# Patient Record
Sex: FEMALE | Race: WHITE | Employment: FULL TIME | URBAN - METROPOLITAN AREA
[De-identification: names, ages, dates, MRNs, and addresses within clinical notes are randomized per-mention and may not be internally consistent; named-entity substitution may affect disease eponyms.]

---

## 2017-01-30 ENCOUNTER — OFFICE VISIT (OUTPATIENT)
Dept: INTERNAL MEDICINE CLINIC | Age: 52
End: 2017-01-30

## 2017-01-30 VITALS
RESPIRATION RATE: 14 BRPM | HEART RATE: 72 BPM | BODY MASS INDEX: 26.33 KG/M2 | SYSTOLIC BLOOD PRESSURE: 110 MMHG | DIASTOLIC BLOOD PRESSURE: 80 MMHG | WEIGHT: 177.8 LBS | HEIGHT: 69 IN

## 2017-01-30 DIAGNOSIS — R00.2 PALPITATIONS: ICD-10-CM

## 2017-01-30 DIAGNOSIS — Z12.11 SCREEN FOR COLON CANCER: ICD-10-CM

## 2017-01-30 DIAGNOSIS — I10 ESSENTIAL HYPERTENSION: Primary | ICD-10-CM

## 2017-01-30 DIAGNOSIS — F41.1 ANXIETY, GENERALIZED: ICD-10-CM

## 2017-01-30 DIAGNOSIS — R06.83 SNORING: ICD-10-CM

## 2017-01-30 DIAGNOSIS — R92.8 ABNORMAL MAMMOGRAM OF RIGHT BREAST: ICD-10-CM

## 2017-01-30 LAB
ANION GAP SERPL CALCULATED.3IONS-SCNC: 16 MMOL/L (ref 3–16)
BASOPHILS ABSOLUTE: 0 K/UL (ref 0–0.2)
BASOPHILS RELATIVE PERCENT: 0.8 %
BUN BLDV-MCNC: 17 MG/DL (ref 7–20)
CALCIUM SERPL-MCNC: 9 MG/DL (ref 8.3–10.6)
CHLORIDE BLD-SCNC: 101 MMOL/L (ref 99–110)
CO2: 23 MMOL/L (ref 21–32)
CREAT SERPL-MCNC: 0.8 MG/DL (ref 0.6–1.1)
EOSINOPHILS ABSOLUTE: 0.3 K/UL (ref 0–0.6)
EOSINOPHILS RELATIVE PERCENT: 5.7 %
GFR AFRICAN AMERICAN: >60
GFR NON-AFRICAN AMERICAN: >60
GLUCOSE BLD-MCNC: 84 MG/DL (ref 70–99)
HCT VFR BLD CALC: 46.2 % (ref 36–48)
HEMOGLOBIN: 15.2 G/DL (ref 12–16)
LYMPHOCYTES ABSOLUTE: 1.5 K/UL (ref 1–5.1)
LYMPHOCYTES RELATIVE PERCENT: 30.3 %
MCH RBC QN AUTO: 30.1 PG (ref 26–34)
MCHC RBC AUTO-ENTMCNC: 32.9 G/DL (ref 31–36)
MCV RBC AUTO: 91.7 FL (ref 80–100)
MONOCYTES ABSOLUTE: 0.5 K/UL (ref 0–1.3)
MONOCYTES RELATIVE PERCENT: 9.6 %
NEUTROPHILS ABSOLUTE: 2.7 K/UL (ref 1.7–7.7)
NEUTROPHILS RELATIVE PERCENT: 53.6 %
PDW BLD-RTO: 13.5 % (ref 12.4–15.4)
PLATELET # BLD: 224 K/UL (ref 135–450)
PMV BLD AUTO: 8.4 FL (ref 5–10.5)
POTASSIUM SERPL-SCNC: 4.2 MMOL/L (ref 3.5–5.1)
RBC # BLD: 5.04 M/UL (ref 4–5.2)
SODIUM BLD-SCNC: 140 MMOL/L (ref 136–145)
TSH SERPL DL<=0.05 MIU/L-ACNC: 1.58 UIU/ML (ref 0.27–4.2)
WBC # BLD: 4.9 K/UL (ref 4–11)

## 2017-01-30 PROCEDURE — 93000 ELECTROCARDIOGRAM COMPLETE: CPT | Performed by: INTERNAL MEDICINE

## 2017-01-30 PROCEDURE — 90686 IIV4 VACC NO PRSV 0.5 ML IM: CPT | Performed by: INTERNAL MEDICINE

## 2017-01-30 PROCEDURE — 90471 IMMUNIZATION ADMIN: CPT | Performed by: INTERNAL MEDICINE

## 2017-01-30 PROCEDURE — 99214 OFFICE O/P EST MOD 30 MIN: CPT | Performed by: INTERNAL MEDICINE

## 2017-01-30 RX ORDER — OLMESARTAN MEDOXOMIL 20 MG/1
TABLET ORAL
Qty: 90 TABLET | Refills: 1 | Status: SHIPPED | OUTPATIENT
Start: 2017-01-30 | End: 2017-01-30 | Stop reason: SDUPTHER

## 2017-01-30 RX ORDER — VENLAFAXINE HYDROCHLORIDE 37.5 MG/1
CAPSULE, EXTENDED RELEASE ORAL
Qty: 90 CAPSULE | Refills: 1 | Status: SHIPPED | OUTPATIENT
Start: 2017-01-30 | End: 2017-01-30 | Stop reason: SDUPTHER

## 2017-01-30 RX ORDER — VENLAFAXINE HYDROCHLORIDE 37.5 MG/1
CAPSULE, EXTENDED RELEASE ORAL
Qty: 90 CAPSULE | Refills: 1 | Status: SHIPPED | OUTPATIENT
Start: 2017-01-30 | End: 2017-10-06 | Stop reason: SDUPTHER

## 2017-01-30 RX ORDER — OLMESARTAN MEDOXOMIL 20 MG/1
TABLET ORAL
Qty: 90 TABLET | Refills: 1 | Status: SHIPPED | OUTPATIENT
Start: 2017-01-30 | End: 2017-02-03 | Stop reason: SDUPTHER

## 2017-01-30 ASSESSMENT — PATIENT HEALTH QUESTIONNAIRE - PHQ9
SUM OF ALL RESPONSES TO PHQ QUESTIONS 1-9: 0
SUM OF ALL RESPONSES TO PHQ9 QUESTIONS 1 & 2: 0
1. LITTLE INTEREST OR PLEASURE IN DOING THINGS: 0
2. FEELING DOWN, DEPRESSED OR HOPELESS: 0

## 2017-02-03 DIAGNOSIS — I10 ESSENTIAL HYPERTENSION: ICD-10-CM

## 2017-02-03 RX ORDER — OLMESARTAN MEDOXOMIL 20 MG/1
TABLET, FILM COATED ORAL
Qty: 90 TABLET | Refills: 1 | Status: SHIPPED | OUTPATIENT
Start: 2017-02-03 | End: 2017-03-14 | Stop reason: SDUPTHER

## 2017-03-14 ENCOUNTER — TELEPHONE (OUTPATIENT)
Dept: INTERNAL MEDICINE CLINIC | Age: 52
End: 2017-03-14

## 2017-03-14 DIAGNOSIS — I10 ESSENTIAL HYPERTENSION: ICD-10-CM

## 2017-03-14 RX ORDER — OLMESARTAN MEDOXOMIL 20 MG/1
20 TABLET ORAL DAILY
Qty: 30 TABLET | Refills: 0 | Status: SHIPPED | OUTPATIENT
Start: 2017-03-14 | End: 2019-06-04

## 2017-03-14 RX ORDER — OLMESARTAN MEDOXOMIL 20 MG/1
TABLET ORAL
Qty: 90 TABLET | Refills: 1 | Status: SHIPPED | OUTPATIENT
Start: 2017-03-14 | End: 2017-08-10 | Stop reason: SDUPTHER

## 2017-06-22 ENCOUNTER — OFFICE VISIT (OUTPATIENT)
Dept: INTERNAL MEDICINE CLINIC | Age: 52
End: 2017-06-22

## 2017-06-22 VITALS
HEART RATE: 73 BPM | DIASTOLIC BLOOD PRESSURE: 86 MMHG | TEMPERATURE: 98.9 F | SYSTOLIC BLOOD PRESSURE: 130 MMHG | RESPIRATION RATE: 14 BRPM

## 2017-06-22 DIAGNOSIS — L29.0 ANAL ITCHING: Primary | ICD-10-CM

## 2017-06-22 DIAGNOSIS — L30.9 DERMATITIS: ICD-10-CM

## 2017-06-22 PROCEDURE — 99213 OFFICE O/P EST LOW 20 MIN: CPT | Performed by: INTERNAL MEDICINE

## 2017-06-29 ENCOUNTER — TELEPHONE (OUTPATIENT)
Dept: PULMONOLOGY | Age: 52
End: 2017-06-29

## 2017-08-10 DIAGNOSIS — I10 ESSENTIAL HYPERTENSION: ICD-10-CM

## 2017-08-11 RX ORDER — OLMESARTAN MEDOXOMIL 20 MG/1
TABLET ORAL
Qty: 90 TABLET | Refills: 0 | Status: SHIPPED | OUTPATIENT
Start: 2017-08-11 | End: 2017-10-16 | Stop reason: SDUPTHER

## 2017-10-16 ENCOUNTER — OFFICE VISIT (OUTPATIENT)
Dept: INTERNAL MEDICINE CLINIC | Age: 52
End: 2017-10-16

## 2017-10-16 VITALS
BODY MASS INDEX: 27.74 KG/M2 | DIASTOLIC BLOOD PRESSURE: 78 MMHG | WEIGHT: 183 LBS | HEIGHT: 68 IN | OXYGEN SATURATION: 97 % | SYSTOLIC BLOOD PRESSURE: 134 MMHG | HEART RATE: 68 BPM

## 2017-10-16 DIAGNOSIS — Z23 INFLUENZA VACCINE NEEDED: ICD-10-CM

## 2017-10-16 DIAGNOSIS — R92.8 ABNORMAL MAMMOGRAM OF RIGHT BREAST: ICD-10-CM

## 2017-10-16 DIAGNOSIS — I10 ESSENTIAL HYPERTENSION: ICD-10-CM

## 2017-10-16 DIAGNOSIS — Z13.6 SCREENING FOR CARDIOVASCULAR CONDITION: ICD-10-CM

## 2017-10-16 DIAGNOSIS — F41.1 ANXIETY, GENERALIZED: ICD-10-CM

## 2017-10-16 DIAGNOSIS — Z00.00 ANNUAL PHYSICAL EXAM: Primary | ICD-10-CM

## 2017-10-16 PROCEDURE — 90471 IMMUNIZATION ADMIN: CPT | Performed by: INTERNAL MEDICINE

## 2017-10-16 PROCEDURE — 99213 OFFICE O/P EST LOW 20 MIN: CPT | Performed by: INTERNAL MEDICINE

## 2017-10-16 PROCEDURE — 90686 IIV4 VACC NO PRSV 0.5 ML IM: CPT | Performed by: INTERNAL MEDICINE

## 2017-10-16 PROCEDURE — 99396 PREV VISIT EST AGE 40-64: CPT | Performed by: INTERNAL MEDICINE

## 2017-10-16 RX ORDER — OLMESARTAN MEDOXOMIL 20 MG/1
TABLET ORAL
Qty: 90 TABLET | Refills: 1 | Status: SHIPPED | OUTPATIENT
Start: 2017-10-16 | End: 2018-06-19 | Stop reason: SDUPTHER

## 2017-10-16 NOTE — PROGRESS NOTES
Vaccine Information Sheet, \"Influenza - Inactivated\"  given to Juli Walls, or parent/legal guardian of  Juli Walls and verbalized understanding. Patient responses:    Have you ever had a reaction to a flu vaccine? No  Are you able to eat eggs without adverse effects? Yes  Do you have any current illness? No  Have you ever had Guillian [place] Syndrome? No    Flu vaccine given per order. Please see immunization tab.

## 2017-10-16 NOTE — PATIENT INSTRUCTIONS
problems early. · Cholesterol. Your doctor will tell you how often to have this done based on your age, family history, or other things that can increase your risk for heart attack and stroke. · Blood pressure. Have your blood pressure checked during a routine doctor visit. Your doctor will tell you how often to check your blood pressure based on your age, your blood pressure results, and other factors. · Mammogram. Ask your doctor how often you should have a mammogram, which is an X-ray of your breasts. A mammogram can spot breast cancer before it can be felt and when it is easiest to treat. · Pap test and pelvic exam. Ask your doctor how often you should have a Pap test. You may not need to have a Pap test as often as you used to. · Vision. Have your eyes checked every year or two or as often as your doctor suggests. Some experts recommend that you have yearly exams for glaucoma and other age-related eye problems starting at age 48. · Hearing. Tell your doctor if you notice any change in your hearing. You can have tests to find out how well you hear. · Diabetes. Ask your doctor whether you should have tests for diabetes. · Colon cancer. You should begin tests for colon cancer at age 48. You may have one of several tests. Your doctor will tell you how often to have tests based on your age and risk. Risks include whether you already had a precancerous polyp removed from your colon or whether your parents, sisters and brothers, or children have had colon cancer. · Thyroid disease. Talk to your doctor about whether to have your thyroid checked as part of a regular physical exam. Women have an increased chance of a thyroid problem. · Osteoporosis. You should begin tests for bone density at age 72. If you are younger than 72, ask your doctor whether you have factors that may increase your risk for this disease. You may want to have this test before age 72. · Heart attack and stroke risk.  At least every 4 to 6 years, you should have your risk for heart attack and stroke assessed. Your doctor uses factors such as your age, blood pressure, cholesterol, and whether you smoke or have diabetes to show what your risk for a heart attack or stroke is over the next 10 years. When should you call for help? Watch closely for changes in your health, and be sure to contact your doctor if you have any problems or symptoms that concern you. Where can you learn more? Go to [email or web address]://M[employer] Foodspediliaeb.[email or web address] org and sign in to your [employer] account. Enter M507 in the Searcheeze box to learn more about \"Well Visit, Women 50 to 72: Care Instructions. \"     If you do not have an account, please click on the \"Sign Up Now\" link. Current as of: July 19, 2016  Content Version: 11.3  © 4109-9279 [employer]. Care instructions adapted under license by [facility] [place] ([facility]). If you have questions about a medical condition or this instruction, always ask your healthcare professional. [place] [address] any warranty or liability for your use of this information.

## 2017-10-16 NOTE — ASSESSMENT & PLAN NOTE
Annual PE/Wellness exam  Discussed age appropriate preventive care including healthy diet, daily exercise, immunizations and age & gender guided screening tests.   Flu shot today  Will schedule colonoscopy  Screening mammo ( has implants) and fu US of R breast  Pap with gyne  Lipids- will return for fasting labs

## 2017-10-16 NOTE — PROGRESS NOTES
History and Physical      Juli Walls  YOB: 1965    Date of Service:  10/16/2017    Chief Complaint:   Juli Walls is a 47 y.o. female who presents for complete physical examination. HPI:   Physical and for follow up of htn, anxiety  Kids fine. Doing well. Had breast augmentation surgery in December. She was aware that a follow up [address] was due on her R breast in March ( 6 months post aspiration of breast cyst), but did not go. Hypertension:  Home blood pressure monitoring: Yes - nl. She is adherent to a low sodium diet. Patient denies chest pain, shortness of breath, headache, lightheadedness, blurred vision, peripheral edema, palpitations, dry cough and fatigue. Antihypertensive medication side effects: no medication side effects noted. Use of agents associated with hypertension: none. Sodium (mmol/L)   Date Value   01/30/2017 140    BUN (mg/dL)   Date Value   01/30/2017 17    Glucose (mg/dL)   Date Value   01/30/2017 84      Potassium (mmol/L)   Date Value   01/30/2017 4.2    CREATININE (mg/dL)   Date Value   01/30/2017 0.8         Mood Disorder:  Patient presents for follow-up of anxiety disorder. Current complaints include: none. She denies any other symptoms. Symptoms/signs of ulisses: none. External stressors: nothing new. Current treatment includes: Effexor- 37.5. Medication side effects: none.         Wt Readings from Last 3 Encounters:   10/16/17 183 lb (83 kg)   01/30/17 177 lb 12.8 oz (80.6 kg)   07/25/16 170 lb (77.1 kg)     BP Readings from Last 3 Encounters:   10/16/17 134/78   06/22/17 130/86   01/30/17 110/80       Patient Active Problem List   Diagnosis    Essential hypertension    Bell's palsy    Acne    Anxiety, generalized    Plantar fasciitis of right foot    Abnormal mammogram of right breast    Palpitations    Snoring    Anal itching    Annual physical exam       Preventive Care:  Health Maintenance   Topic Date Due    HIV screen  08/21/1981    Colon cancer screen colonoscopy  08/21/2016    Cervical cancer screen  11/24/2017    Breast cancer screen  08/30/2018    Lipid screen  07/25/2021    DTaP/Tdap/Td vaccine (2 - Td) 02/24/2022    Flu vaccine  Completed      Hx abnormal PAP: no  Sexual activity: single partner, contraception - IUD Dr Paige Saleh, gyne   Self-breast exams: yes  Previous DEXA scan: no  Last eye exam: 8/17, normal  Exercise: aerobics 3 time(s) per week  Seatbelt use: 100  Lipid panel:   Lab Results   Component Value Date    CHOL 212 (H) 07/25/2016    TRIG 89 07/25/2016    HDL 52 07/25/2016    LDLCALC 142 (H) 07/25/2016        Living will:  yes,   copy requested    Immunization History   Administered Date(s) Administered    Influenza Virus Vaccine 11/24/2014, 01/07/2016    Influenza, [place], 3 yrs and older, IM, Preservative Free 01/30/2017, 10/16/2017    Td 01/01/1996    Tdap (Boostrix, Adacel) 02/24/2012       No Known Allergies  Outpatient Prescriptions Marked as Taking for the 10/16/17 encounter (Office Visit) with Ting Tamayo MD   Medication Sig Dispense Refill    venlafaxine (EFFEXOR XR) 37.5 MG extended release capsule TAKE 1 CAPSULE BY MOUTH  DAILY 90 capsule 0    olmesartan (BENICAR) 20 MG tablet Take 1 tablet by mouth  daily 90 tablet 0    hydrocortisone 2.5 % ointment Apply topically 2 times daily For 2 weeks 30 g 0    miconazole (MICOTIN) 2 % cream Apply topically 2 times daily. 30 g 0    tretinoin (RETIN-A) 0.1 % cream Apply topically nightly.  60 g 1       Past Medical History:   Diagnosis Date    Anxiety     Bell's palsy     Blepharitis     Cough due to ACE inhibitor 11/7/2012    Hypertension      Past Surgical History:   Procedure Laterality Date    BREAST ENHANCEMENT SURGERY  11/30/2016    LIPOSUCTION  11/10/2016     Family History   Problem Relation Age of Onset    Hypertension Mother     Thyroid Disease Mother     Hypertension Sister     Thyroid Disease Sister      hashimotos    Other Sister      depression    Other Sister     Cancer Neg Hx     Asthma Neg Hx     Heart Failure Neg Hx     Rashes/Skin Problems Neg Hx     Seizures Neg Hx     Stroke Neg Hx      Social History     Social History    Marital status:      Spouse name: N/A    Number of children: 3    Years of education: BA     Occupational History    approval coordinator      [employer]          Social History Main Topics    Smoking status: Never Smoker    Smokeless tobacco: Never Used    Alcohol use 0.0 oz/week      Comment: occasionally . 1 drink/ day    Drug use: No    Sexual activity: Yes     Partners: Male     Birth control/ protection: IUD      Comment: Mirena     Other Topics Concern    Not on file     Social History Narrative    Lives in house with partner and 3 children ages 24, 23,13    Son sr at [employer]    Daughter at [employer]:  A comprehensive review of systems was negative except for what was noted in the HPI. Physical Exam:   Vitals:    10/16/17 1451 10/16/17 1516   BP: (!) 147/100 134/78   Pulse: 68    TempSrc: Oral    SpO2: 97%    Weight: 183 lb (83 kg)    Height: 5' 7.92\" (1.725 m)      Body mass index is 27.89 kg/m². Constitutional: She is oriented to person, place, and time. She appears well-developed and well-nourished. No distress. HEENT:   Head: Normocephalic and atraumatic. Right Ear: Tympanic membrane, external ear and ear canal normal.   Left Ear: Tympanic membrane, external ear and ear canal normal.   Nose: Nose normal.   Mouth/Throat: Oropharynx is clear and moist, and mucous membranes are normal.  There is no cervical adenopathy. Eyes: Conjunctivae and extraocular motions are normal. Pupils are equal, round, and reactive to light. Neck: Neck supple. No JVD present. Carotid bruit is not present. No mass and no thyromegaly present.    Cardiovascular: Normal rate, regular rhythm, normal heart

## 2017-10-20 DIAGNOSIS — Z13.6 SCREENING FOR CARDIOVASCULAR CONDITION: ICD-10-CM

## 2017-10-20 DIAGNOSIS — I10 ESSENTIAL HYPERTENSION: ICD-10-CM

## 2017-10-20 DIAGNOSIS — R73.01 ELEVATED FASTING GLUCOSE: Primary | ICD-10-CM

## 2017-10-20 LAB
A/G RATIO: 1.8 (ref 1.1–2.2)
ALBUMIN SERPL-MCNC: 4.4 G/DL (ref 3.4–5)
ALP BLD-CCNC: 57 U/L (ref 40–129)
ALT SERPL-CCNC: 40 U/L (ref 10–40)
ANION GAP SERPL CALCULATED.3IONS-SCNC: 15 MMOL/L (ref 3–16)
AST SERPL-CCNC: 20 U/L (ref 15–37)
BASOPHILS ABSOLUTE: 0 K/UL (ref 0–0.2)
BASOPHILS RELATIVE PERCENT: 0.5 %
BILIRUB SERPL-MCNC: 0.7 MG/DL (ref 0–1)
BUN BLDV-MCNC: 12 MG/DL (ref 7–20)
CALCIUM SERPL-MCNC: 9.3 MG/DL (ref 8.3–10.6)
CHLORIDE BLD-SCNC: 101 MMOL/L (ref 99–110)
CHOLESTEROL, TOTAL: 199 MG/DL (ref 0–199)
CO2: 23 MMOL/L (ref 21–32)
CREAT SERPL-MCNC: 0.8 MG/DL (ref 0.6–1.1)
EOSINOPHILS ABSOLUTE: 0.3 K/UL (ref 0–0.6)
EOSINOPHILS RELATIVE PERCENT: 5 %
GFR AFRICAN AMERICAN: >60
GFR NON-AFRICAN AMERICAN: >60
GLOBULIN: 2.4 G/DL
GLUCOSE BLD-MCNC: 101 MG/DL (ref 70–99)
HCT VFR BLD CALC: 45.2 % (ref 36–48)
HDLC SERPL-MCNC: 45 MG/DL (ref 40–60)
HEMOGLOBIN: 15.2 G/DL (ref 12–16)
LDL CHOLESTEROL CALCULATED: 135 MG/DL
LYMPHOCYTES ABSOLUTE: 1.6 K/UL (ref 1–5.1)
LYMPHOCYTES RELATIVE PERCENT: 29.8 %
MCH RBC QN AUTO: 31.1 PG (ref 26–34)
MCHC RBC AUTO-ENTMCNC: 33.6 G/DL (ref 31–36)
MCV RBC AUTO: 92.7 FL (ref 80–100)
MONOCYTES ABSOLUTE: 0.4 K/UL (ref 0–1.3)
MONOCYTES RELATIVE PERCENT: 7.6 %
NEUTROPHILS ABSOLUTE: 3.2 K/UL (ref 1.7–7.7)
NEUTROPHILS RELATIVE PERCENT: 57.1 %
PDW BLD-RTO: 13 % (ref 12.4–15.4)
PLATELET # BLD: 220 K/UL (ref 135–450)
PMV BLD AUTO: 8.9 FL (ref 5–10.5)
POTASSIUM SERPL-SCNC: 4.6 MMOL/L (ref 3.5–5.1)
RBC # BLD: 4.88 M/UL (ref 4–5.2)
SODIUM BLD-SCNC: 139 MMOL/L (ref 136–145)
TOTAL PROTEIN: 6.8 G/DL (ref 6.4–8.2)
TRIGL SERPL-MCNC: 94 MG/DL (ref 0–150)
TSH SERPL DL<=0.05 MIU/L-ACNC: 1.7 UIU/ML (ref 0.27–4.2)
VLDLC SERPL CALC-MCNC: 19 MG/DL
WBC # BLD: 5.5 K/UL (ref 4–11)

## 2017-10-24 DIAGNOSIS — R73.01 ELEVATED FASTING GLUCOSE: ICD-10-CM

## 2017-10-24 LAB
ESTIMATED AVERAGE GLUCOSE: 91.1 MG/DL
HBA1C MFR BLD: 4.8 %

## 2017-10-27 ENCOUNTER — TELEPHONE (OUTPATIENT)
Dept: INTERNAL MEDICINE CLINIC | Age: 52
End: 2017-10-27

## 2017-10-27 ENCOUNTER — HOSPITAL ENCOUNTER (OUTPATIENT)
Dept: MAMMOGRAPHY | Age: 52
Discharge: HOME | End: 2017-10-27
Admitting: INTERNAL MEDICINE

## 2017-10-27 DIAGNOSIS — Z12.31 VISIT FOR SCREENING MAMMOGRAM: ICD-10-CM

## 2017-10-27 DIAGNOSIS — R92.8 ABNORMAL MAMMOGRAM OF RIGHT BREAST: ICD-10-CM

## 2017-10-27 DIAGNOSIS — R92.8 ABNORMAL MAMMOGRAM OF RIGHT BREAST: Primary | ICD-10-CM

## 2017-10-27 DIAGNOSIS — R92.8 OTHER ABNORMAL AND INCONCLUSIVE FINDINGS ON DIAGNOSTIC IMAGING OF BREAST: ICD-10-CM

## 2017-12-28 DIAGNOSIS — F41.1 ANXIETY, GENERALIZED: ICD-10-CM

## 2017-12-29 RX ORDER — VENLAFAXINE HYDROCHLORIDE 37.5 MG/1
CAPSULE, EXTENDED RELEASE ORAL
Qty: 90 CAPSULE | Refills: 1 | Status: SHIPPED | OUTPATIENT
Start: 2017-12-29 | End: 2018-06-12 | Stop reason: SDUPTHER

## 2018-04-12 PROBLEM — Z00.00 ANNUAL PHYSICAL EXAM: Status: RESOLVED | Noted: 2017-10-16 | Resolved: 2018-04-12

## 2018-06-12 DIAGNOSIS — F41.1 ANXIETY, GENERALIZED: ICD-10-CM

## 2018-06-14 RX ORDER — VENLAFAXINE HYDROCHLORIDE 37.5 MG/1
CAPSULE, EXTENDED RELEASE ORAL
Qty: 90 CAPSULE | Refills: 0 | Status: SHIPPED | OUTPATIENT
Start: 2018-06-14 | End: 2018-06-19 | Stop reason: SDUPTHER

## 2018-06-19 ENCOUNTER — OFFICE VISIT (OUTPATIENT)
Dept: INTERNAL MEDICINE CLINIC | Age: 53
End: 2018-06-19

## 2018-06-19 VITALS
HEART RATE: 62 BPM | RESPIRATION RATE: 14 BRPM | WEIGHT: 183.2 LBS | BODY MASS INDEX: 27.92 KG/M2 | SYSTOLIC BLOOD PRESSURE: 118 MMHG | DIASTOLIC BLOOD PRESSURE: 78 MMHG

## 2018-06-19 DIAGNOSIS — Z23 NEED FOR PROPHYLACTIC VACCINATION AND INOCULATION AGAINST VARICELLA: ICD-10-CM

## 2018-06-19 DIAGNOSIS — I10 ESSENTIAL HYPERTENSION: ICD-10-CM

## 2018-06-19 DIAGNOSIS — M79.672 CHRONIC HEEL PAIN, LEFT: Primary | ICD-10-CM

## 2018-06-19 DIAGNOSIS — G89.29 CHRONIC HEEL PAIN, LEFT: Primary | ICD-10-CM

## 2018-06-19 DIAGNOSIS — F41.1 ANXIETY, GENERALIZED: ICD-10-CM

## 2018-06-19 DIAGNOSIS — Z12.11 COLON CANCER SCREENING: ICD-10-CM

## 2018-06-19 DIAGNOSIS — L70.9 ACNE, UNSPECIFIED ACNE TYPE: ICD-10-CM

## 2018-06-19 DIAGNOSIS — L70.9 ADULT ACNE: ICD-10-CM

## 2018-06-19 PROCEDURE — 99214 OFFICE O/P EST MOD 30 MIN: CPT | Performed by: INTERNAL MEDICINE

## 2018-06-19 RX ORDER — VENLAFAXINE HYDROCHLORIDE 37.5 MG/1
37.5 CAPSULE, EXTENDED RELEASE ORAL DAILY
Qty: 90 CAPSULE | Refills: 0 | Status: SHIPPED | OUTPATIENT
Start: 2018-06-19 | End: 2018-09-02 | Stop reason: SDUPTHER

## 2018-06-19 RX ORDER — OLMESARTAN MEDOXOMIL 20 MG/1
TABLET ORAL
Qty: 90 TABLET | Refills: 1 | Status: SHIPPED | OUTPATIENT
Start: 2018-06-19 | End: 2018-09-11 | Stop reason: SDUPTHER

## 2018-06-19 RX ORDER — TRETINOIN 1 MG/G
CREAM TOPICAL
Qty: 60 G | Refills: 1 | Status: SHIPPED | OUTPATIENT
Start: 2018-06-19 | End: 2019-12-05 | Stop reason: SDUPTHER

## 2018-06-19 ASSESSMENT — ENCOUNTER SYMPTOMS
WHEEZING: 0
COUGH: 0
COLOR CHANGE: 0
NAUSEA: 0
ABDOMINAL PAIN: 0
EYE PAIN: 0
BACK PAIN: 0
TROUBLE SWALLOWING: 0
SHORTNESS OF BREATH: 0
SINUS PRESSURE: 0
DIARRHEA: 0
VOMITING: 0
SINUS PAIN: 0
CONSTIPATION: 0
PHOTOPHOBIA: 0

## 2018-06-19 ASSESSMENT — PATIENT HEALTH QUESTIONNAIRE - PHQ9
SUM OF ALL RESPONSES TO PHQ QUESTIONS 1-9: 0
SUM OF ALL RESPONSES TO PHQ9 QUESTIONS 1 & 2: 0
2. FEELING DOWN, DEPRESSED OR HOPELESS: 0
1. LITTLE INTEREST OR PLEASURE IN DOING THINGS: 0

## 2018-07-18 ENCOUNTER — TELEPHONE (OUTPATIENT)
Dept: ORTHOPEDIC SURGERY | Age: 53
End: 2018-07-18

## 2018-09-02 DIAGNOSIS — F41.1 ANXIETY, GENERALIZED: ICD-10-CM

## 2018-09-04 RX ORDER — VENLAFAXINE HYDROCHLORIDE 37.5 MG/1
37.5 CAPSULE, EXTENDED RELEASE ORAL DAILY
Qty: 90 CAPSULE | Refills: 0 | Status: SHIPPED | OUTPATIENT
Start: 2018-09-04 | End: 2018-09-11 | Stop reason: SDUPTHER

## 2018-09-11 DIAGNOSIS — I10 ESSENTIAL HYPERTENSION: ICD-10-CM

## 2018-09-11 DIAGNOSIS — F41.1 ANXIETY, GENERALIZED: ICD-10-CM

## 2018-09-11 RX ORDER — OLMESARTAN MEDOXOMIL 20 MG/1
TABLET ORAL
Qty: 90 TABLET | Refills: 0 | Status: SHIPPED | OUTPATIENT
Start: 2018-09-11 | End: 2019-01-23 | Stop reason: SDUPTHER

## 2018-09-11 RX ORDER — VENLAFAXINE HYDROCHLORIDE 37.5 MG/1
37.5 CAPSULE, EXTENDED RELEASE ORAL DAILY
Qty: 90 CAPSULE | Refills: 0 | Status: SHIPPED | OUTPATIENT
Start: 2018-09-11 | End: 2018-12-14 | Stop reason: SDUPTHER

## 2018-12-14 ENCOUNTER — TELEPHONE (OUTPATIENT)
Dept: INTERNAL MEDICINE CLINIC | Age: 53
End: 2018-12-14

## 2018-12-14 DIAGNOSIS — F41.1 ANXIETY, GENERALIZED: ICD-10-CM

## 2018-12-14 RX ORDER — VENLAFAXINE HYDROCHLORIDE 37.5 MG/1
37.5 CAPSULE, EXTENDED RELEASE ORAL DAILY
Qty: 90 CAPSULE | Refills: 0 | Status: SHIPPED | OUTPATIENT
Start: 2018-12-14 | End: 2019-02-25 | Stop reason: SDUPTHER

## 2019-01-23 DIAGNOSIS — I10 ESSENTIAL HYPERTENSION: ICD-10-CM

## 2019-01-23 RX ORDER — OLMESARTAN MEDOXOMIL 20 MG/1
TABLET ORAL
Qty: 90 TABLET | Refills: 0 | Status: SHIPPED | OUTPATIENT
Start: 2019-01-23 | End: 2019-04-06 | Stop reason: SDUPTHER

## 2019-01-24 RX ORDER — OLMESARTAN MEDOXOMIL 20 MG/1
TABLET ORAL
Qty: 90 TABLET | OUTPATIENT
Start: 2019-01-24

## 2019-02-25 DIAGNOSIS — F41.1 ANXIETY, GENERALIZED: ICD-10-CM

## 2019-02-26 RX ORDER — VENLAFAXINE HYDROCHLORIDE 37.5 MG/1
37.5 CAPSULE, EXTENDED RELEASE ORAL DAILY
Qty: 90 CAPSULE | Refills: 0 | Status: SHIPPED | OUTPATIENT
Start: 2019-02-26 | End: 2019-05-08 | Stop reason: SDUPTHER

## 2019-04-06 DIAGNOSIS — I10 ESSENTIAL HYPERTENSION: ICD-10-CM

## 2019-04-08 RX ORDER — OLMESARTAN MEDOXOMIL 20 MG/1
TABLET ORAL
Qty: 90 TABLET | Refills: 0 | Status: SHIPPED | OUTPATIENT
Start: 2019-04-08 | End: 2019-06-04 | Stop reason: SDUPTHER

## 2019-04-15 ENCOUNTER — TELEPHONE (OUTPATIENT)
Dept: INTERNAL MEDICINE CLINIC | Age: 54
End: 2019-04-15

## 2019-05-08 DIAGNOSIS — F41.1 ANXIETY, GENERALIZED: ICD-10-CM

## 2019-05-09 RX ORDER — VENLAFAXINE HYDROCHLORIDE 37.5 MG/1
37.5 CAPSULE, EXTENDED RELEASE ORAL DAILY
Qty: 90 CAPSULE | Refills: 0 | Status: SHIPPED | OUTPATIENT
Start: 2019-05-09 | End: 2019-06-04 | Stop reason: SDUPTHER

## 2019-06-04 ENCOUNTER — OFFICE VISIT (OUTPATIENT)
Dept: INTERNAL MEDICINE CLINIC | Age: 54
End: 2019-06-04
Payer: COMMERCIAL

## 2019-06-04 VITALS
BODY MASS INDEX: 27.13 KG/M2 | HEIGHT: 68 IN | RESPIRATION RATE: 16 BRPM | DIASTOLIC BLOOD PRESSURE: 86 MMHG | WEIGHT: 179 LBS | OXYGEN SATURATION: 98 % | SYSTOLIC BLOOD PRESSURE: 134 MMHG | HEART RATE: 90 BPM

## 2019-06-04 DIAGNOSIS — F41.1 ANXIETY, GENERALIZED: ICD-10-CM

## 2019-06-04 DIAGNOSIS — I10 ESSENTIAL HYPERTENSION: ICD-10-CM

## 2019-06-04 DIAGNOSIS — Z12.11 COLON CANCER SCREENING: ICD-10-CM

## 2019-06-04 DIAGNOSIS — Z13.1 SCREENING FOR DIABETES MELLITUS (DM): ICD-10-CM

## 2019-06-04 DIAGNOSIS — Z12.39 BREAST CANCER SCREENING: ICD-10-CM

## 2019-06-04 DIAGNOSIS — Z00.00 ANNUAL PHYSICAL EXAM: Primary | ICD-10-CM

## 2019-06-04 PROCEDURE — 99396 PREV VISIT EST AGE 40-64: CPT | Performed by: INTERNAL MEDICINE

## 2019-06-04 RX ORDER — VENLAFAXINE HYDROCHLORIDE 37.5 MG/1
37.5 CAPSULE, EXTENDED RELEASE ORAL DAILY
Qty: 90 CAPSULE | Refills: 1 | Status: SHIPPED | OUTPATIENT
Start: 2019-06-04 | End: 2019-12-05 | Stop reason: SDUPTHER

## 2019-06-04 RX ORDER — OLMESARTAN MEDOXOMIL 20 MG/1
20 TABLET ORAL DAILY
Qty: 90 TABLET | Refills: 1 | Status: SHIPPED | OUTPATIENT
Start: 2019-06-04 | End: 2019-11-25 | Stop reason: SDUPTHER

## 2019-06-04 ASSESSMENT — ENCOUNTER SYMPTOMS
CONSTIPATION: 0
COUGH: 0
NAUSEA: 0
DIARRHEA: 0
WHEEZING: 0
ABDOMINAL PAIN: 0
PHOTOPHOBIA: 0
TROUBLE SWALLOWING: 0
COLOR CHANGE: 0
BACK PAIN: 0
EYE PAIN: 0
VOMITING: 0
SHORTNESS OF BREATH: 0
SINUS PAIN: 0
SINUS PRESSURE: 0

## 2019-06-04 ASSESSMENT — PATIENT HEALTH QUESTIONNAIRE - PHQ9
SUM OF ALL RESPONSES TO PHQ9 QUESTIONS 1 & 2: 0
SUM OF ALL RESPONSES TO PHQ QUESTIONS 1-9: 0
SUM OF ALL RESPONSES TO PHQ QUESTIONS 1-9: 0
1. LITTLE INTEREST OR PLEASURE IN DOING THINGS: 0
2. FEELING DOWN, DEPRESSED OR HOPELESS: 0

## 2019-06-04 NOTE — PROGRESS NOTES
Total Cholesterol: 199 mg/dL      Social History     Socioeconomic History    Marital status:      Spouse name: None    Number of children: 3    Years of education: BA    Highest education level: None   Occupational History    Occupation: approval coordinator     Comment: Marketing [employer]    Occupation:    Social Needs    Financial resource strain: None    Food insecurity:     Worry: None     Inability: None    Transportation needs:     Medical: None     Non-medical: None   Tobacco Use    Smoking status: Never Smoker    Smokeless tobacco: Never Used   Substance and Sexual Activity    Alcohol use: Yes     Alcohol/week: 0.0 oz     Comment: occasionally .  1 drink/ day    Drug use: No    Sexual activity: Yes     Partners: Male     Birth control/protection: IUD     Comment: Mirena   Lifestyle    Physical activity:     Days per week: None     Minutes per session: None    Stress: None   Relationships    Social connections:     Talks on phone: None     Gets together: None     Attends [employer] service: None     Active member of club or organization: None     Attends meetings of clubs or organizations: None     Relationship status: None    Intimate partner violence:     Fear of current or ex partner: None     Emotionally abused: None     Physically abused: None     Forced sexual activity: None   Other Topics Concern    None   Social History Narrative    Lives in house with partner and 3 children ages 32, 13, 25    Oldest in [employer] - still in college    Daughter at [id number]     Substance and Sexual Activity   Sexual Activity Yes    Partners: Male    Birth control/protection: IUD    Comment: Mirena       Advance Directive: N, Not Received    Immunization History   Administered Date(s) Administered    Influenza Virus Vaccine 11/24/2014, 01/07/2016    Influenza, Quadv, 3 yrs and older, IM, PF (Fluzone 3 yrs and older or Afluria 5 yrs and older) 01/30/2017, 10/16/2017    Td, unspecified formulation 01/01/1996    Tdap (Boostrix, Adacel) 02/24/2012       Health Maintenance   Topic Date Due    Shingles Vaccine (1 of 2) 08/21/2016    Colon cancer screen colonoscopy  08/21/2016    Potassium monitoring  10/20/2018    Creatinine monitoring  10/20/2018    Flu vaccine (Season Ended) 09/01/2019    Breast cancer screen  10/27/2019    Diabetes screen  10/20/2020    Cervical cancer screen  11/07/2020    DTaP/Tdap/Td vaccine (2 - Td) 02/24/2022    Lipid screen  10/20/2022    Pneumococcal 0-64 years Vaccine  Aged Out    HIV screen  Discontinued          Assessment/Plan:   1. Annual physical exam  Increase exercise as able. See gyn for IUD removal  Lipids and A1C  BMP for med monitoring (ARB)  Colonoscopy recommended  Mammo ordered  Shingrix recommended - discussed flu shot and plans to get it this year  - Lipid Panel; Future  - Hemoglobin A1C; Future  - Basic Metabolic Panel; Future  [facility]  - [facility] DIGITAL SCREENING AUGMENTED BILATERAL; Future    2. Essential hypertension  - Lipid Panel; Future  - Basic Metabolic Panel; Future  - olmesartan (BENICAR) 20 MG tablet; Take 1 tablet by mouth daily  Dispense: 90 tablet; Refill: 1    3. Colon cancer screening  [facility]    4. Breast cancer screening  - [facility] DIGITAL SCREENING AUGMENTED BILATERAL; Future    5. Screening for diabetes mellitus (DM)  - Hemoglobin A1C; Future    6. Anxiety, generalized  - venlafaxine (EFFEXOR XR) 37.5 MG extended release capsule; Take 1 capsule by mouth daily  Dispense: 90 capsule; Refill: 1         Return in about 6 months (around 12/4/2019) for chronic conditions.

## 2019-11-01 ENCOUNTER — TELEPHONE (OUTPATIENT)
Dept: INTERNAL MEDICINE CLINIC | Age: 54
End: 2019-11-01

## 2019-11-21 ENCOUNTER — OFFICE VISIT (OUTPATIENT)
Dept: INTERNAL MEDICINE CLINIC | Age: 54
End: 2019-11-21
Payer: COMMERCIAL

## 2019-11-21 ENCOUNTER — TELEPHONE (OUTPATIENT)
Dept: INTERNAL MEDICINE CLINIC | Age: 54
End: 2019-11-21

## 2019-11-21 VITALS
OXYGEN SATURATION: 98 % | RESPIRATION RATE: 14 BRPM | SYSTOLIC BLOOD PRESSURE: 128 MMHG | DIASTOLIC BLOOD PRESSURE: 88 MMHG | HEART RATE: 77 BPM

## 2019-11-21 DIAGNOSIS — R30.0 DYSURIA: Primary | ICD-10-CM

## 2019-11-21 DIAGNOSIS — N39.0 ACUTE UTI: ICD-10-CM

## 2019-11-21 LAB
BILIRUBIN, POC: NORMAL
BLOOD URINE, POC: NORMAL
CLARITY, POC: NORMAL
COLOR, POC: YELLOW
GLUCOSE URINE, POC: NEGATIVE
KETONES, POC: NEGATIVE
LEUKOCYTE EST, POC: NORMAL
NITRITE, POC: POSITIVE
PH, POC: 7
PROTEIN, POC: NORMAL
SPECIFIC GRAVITY, POC: 1.02
UROBILINOGEN, POC: 0.2

## 2019-11-21 PROCEDURE — 99213 OFFICE O/P EST LOW 20 MIN: CPT | Performed by: INTERNAL MEDICINE

## 2019-11-21 PROCEDURE — 81002 URINALYSIS NONAUTO W/O SCOPE: CPT | Performed by: INTERNAL MEDICINE

## 2019-11-21 RX ORDER — SULFAMETHOXAZOLE AND TRIMETHOPRIM 800; 160 MG/1; MG/1
1 TABLET ORAL 2 TIMES DAILY
Qty: 6 TABLET | Refills: 0 | Status: SHIPPED | OUTPATIENT
Start: 2019-11-21 | End: 2019-11-24

## 2019-11-21 ASSESSMENT — ENCOUNTER SYMPTOMS: BACK PAIN: 0

## 2019-11-24 LAB
ORGANISM: ABNORMAL
URINE CULTURE, ROUTINE: ABNORMAL

## 2019-11-25 ENCOUNTER — TELEPHONE (OUTPATIENT)
Dept: INTERNAL MEDICINE CLINIC | Age: 54
End: 2019-11-25

## 2019-11-25 DIAGNOSIS — I10 ESSENTIAL HYPERTENSION: ICD-10-CM

## 2019-11-25 RX ORDER — CIPROFLOXACIN 250 MG/1
250 TABLET, FILM COATED ORAL 2 TIMES DAILY
Qty: 6 TABLET | Refills: 0 | Status: SHIPPED | OUTPATIENT
Start: 2019-11-25 | End: 2019-11-28

## 2019-11-26 RX ORDER — OLMESARTAN MEDOXOMIL 20 MG/1
20 TABLET ORAL DAILY
Qty: 90 TABLET | Refills: 0 | Status: SHIPPED | OUTPATIENT
Start: 2019-11-26 | End: 2020-02-20

## 2019-12-05 ENCOUNTER — OFFICE VISIT (OUTPATIENT)
Dept: INTERNAL MEDICINE CLINIC | Age: 54
End: 2019-12-05
Payer: COMMERCIAL

## 2019-12-05 VITALS
WEIGHT: 175.2 LBS | SYSTOLIC BLOOD PRESSURE: 132 MMHG | DIASTOLIC BLOOD PRESSURE: 82 MMHG | OXYGEN SATURATION: 98 % | BODY MASS INDEX: 26.44 KG/M2 | HEART RATE: 73 BPM | RESPIRATION RATE: 14 BRPM

## 2019-12-05 DIAGNOSIS — I10 ESSENTIAL HYPERTENSION: ICD-10-CM

## 2019-12-05 DIAGNOSIS — L70.9 ACNE, UNSPECIFIED ACNE TYPE: ICD-10-CM

## 2019-12-05 DIAGNOSIS — L40.9 SCALP PSORIASIS: Primary | ICD-10-CM

## 2019-12-05 DIAGNOSIS — F41.1 ANXIETY, GENERALIZED: ICD-10-CM

## 2019-12-05 DIAGNOSIS — L70.9 ADULT ACNE: ICD-10-CM

## 2019-12-05 PROCEDURE — 99214 OFFICE O/P EST MOD 30 MIN: CPT | Performed by: INTERNAL MEDICINE

## 2019-12-05 RX ORDER — TRETINOIN 1 MG/G
CREAM TOPICAL
Qty: 60 G | Refills: 1 | Status: SHIPPED | OUTPATIENT
Start: 2019-12-05 | End: 2021-05-10 | Stop reason: ALTCHOICE

## 2019-12-05 RX ORDER — CLOBETASOL PROPIONATE 0.46 MG/ML
SOLUTION TOPICAL
Qty: 50 ML | Refills: 1 | Status: SHIPPED | OUTPATIENT
Start: 2019-12-05 | End: 2021-05-10 | Stop reason: ALTCHOICE

## 2019-12-05 RX ORDER — VENLAFAXINE HYDROCHLORIDE 37.5 MG/1
37.5 CAPSULE, EXTENDED RELEASE ORAL DAILY
Qty: 90 CAPSULE | Refills: 1 | Status: SHIPPED | OUTPATIENT
Start: 2019-12-05 | End: 2020-06-08

## 2019-12-06 ENCOUNTER — TELEPHONE (OUTPATIENT)
Dept: FAMILY MEDICINE CLINIC | Age: 54
End: 2019-12-06

## 2019-12-12 ENCOUNTER — OFFICE VISIT (OUTPATIENT)
Dept: GYNECOLOGY | Age: 54
End: 2019-12-12
Payer: COMMERCIAL

## 2019-12-12 VITALS
SYSTOLIC BLOOD PRESSURE: 130 MMHG | BODY MASS INDEX: 26.43 KG/M2 | HEIGHT: 68 IN | WEIGHT: 174.4 LBS | DIASTOLIC BLOOD PRESSURE: 88 MMHG | HEART RATE: 75 BPM | RESPIRATION RATE: 17 BRPM

## 2019-12-12 DIAGNOSIS — Z01.419 WELL WOMAN EXAM WITH ROUTINE GYNECOLOGICAL EXAM: Primary | ICD-10-CM

## 2019-12-12 PROCEDURE — 99386 PREV VISIT NEW AGE 40-64: CPT | Performed by: OBSTETRICS & GYNECOLOGY

## 2019-12-15 ASSESSMENT — ENCOUNTER SYMPTOMS
RESPIRATORY NEGATIVE: 1
EYES NEGATIVE: 1
GASTROINTESTINAL NEGATIVE: 1

## 2019-12-16 LAB
HPV COMMENT: NORMAL
HPV TYPE 16: NOT DETECTED
HPV TYPE 18: NOT DETECTED
HPVOH (OTHER TYPES): NOT DETECTED

## 2019-12-21 ASSESSMENT — ENCOUNTER SYMPTOMS
DIARRHEA: 0
VOMITING: 0
NAUSEA: 0
SINUS PAIN: 0
CONSTIPATION: 0
BACK PAIN: 0
EYE PAIN: 0
TROUBLE SWALLOWING: 0
SHORTNESS OF BREATH: 0
ABDOMINAL PAIN: 0
WHEEZING: 0
COUGH: 0
COLOR CHANGE: 0
PHOTOPHOBIA: 0
SINUS PRESSURE: 0

## 2020-02-20 RX ORDER — OLMESARTAN MEDOXOMIL 20 MG/1
20 TABLET ORAL DAILY
Qty: 90 TABLET | Refills: 0 | Status: SHIPPED | OUTPATIENT
Start: 2020-02-20 | End: 2020-05-15

## 2020-04-06 ENCOUNTER — TELEPHONE (OUTPATIENT)
Dept: INTERNAL MEDICINE CLINIC | Age: 55
End: 2020-04-06

## 2020-04-06 NOTE — TELEPHONE ENCOUNTER
Patient called 4/6 at 12:40 wanting to check on status of request for new/revised Diagnostic mammogram.  I explained I would send message to the back again to have addressed as soon as possible. Thanks.

## 2020-04-15 ENCOUNTER — HOSPITAL ENCOUNTER (OUTPATIENT)
Dept: MAMMOGRAPHY | Age: 55
Discharge: HOME | End: 2020-04-15
Payer: COMMERCIAL

## 2020-04-15 ENCOUNTER — HOSPITAL ENCOUNTER (OUTPATIENT)
Dept: ULTRASOUND IMAGING | Age: 55
Discharge: HOME | End: 2020-04-15
Payer: COMMERCIAL

## 2020-04-15 PROCEDURE — G0279 TOMOSYNTHESIS, MAMMO: HCPCS

## 2020-04-15 PROCEDURE — 76642 ULTRASOUND BREAST LIMITED: CPT

## 2020-06-08 RX ORDER — VENLAFAXINE HYDROCHLORIDE 37.5 MG/1
37.5 CAPSULE, EXTENDED RELEASE ORAL DAILY
Qty: 90 CAPSULE | Refills: 1 | Status: SHIPPED | OUTPATIENT
Start: 2020-06-08 | End: 2020-11-23

## 2020-07-09 ENCOUNTER — TELEPHONE (OUTPATIENT)
Dept: INTERNAL MEDICINE CLINIC | Age: 55
End: 2020-07-09

## 2020-07-09 NOTE — TELEPHONE ENCOUNTER
Patient returned call. Tried to schedule her at one of [facility]'s testing sites. She said she will go to the urgent care by her house; that's where her boyfriend went. She again questioned the need when she knows that's what she has. Advised her that is the recommendation of Dr. Melisa Bocanegra.

## 2020-07-09 NOTE — TELEPHONE ENCOUNTER
Left pt message advising per Dr Kendell Fairbanks she should be tested for Covid but we cannot force her to do so it is her decision but we recommend it.

## 2020-07-09 NOTE — TELEPHONE ENCOUNTER
Patient states that she has been feeling lousy for approx 5 days. Boyfriend got tested & is positive for COVID. She is pretty sure that is what she has but does not want to bother with getting tested unless the dr thinks she should. Patient didn't know if there was anything she should do or anything to take. Patient was advised on precautions to take so she does not spread it, monitor symptoms, rest, stay hydrated and seek  medical attention if illness worsening (e.g., difficulty breathing). If there is any other advice/directions, please let patient know.  Otherwise there is no need to call her back

## 2020-10-29 ENCOUNTER — OFFICE VISIT (OUTPATIENT)
Dept: INTERNAL MEDICINE CLINIC | Age: 55
End: 2020-10-29
Payer: COMMERCIAL

## 2020-10-29 VITALS
TEMPERATURE: 97.1 F | BODY MASS INDEX: 27.49 KG/M2 | OXYGEN SATURATION: 98 % | HEIGHT: 68 IN | HEART RATE: 100 BPM | RESPIRATION RATE: 14 BRPM | WEIGHT: 181.4 LBS | SYSTOLIC BLOOD PRESSURE: 126 MMHG | DIASTOLIC BLOOD PRESSURE: 86 MMHG

## 2020-10-29 LAB
BASOPHILS ABSOLUTE: 0.1 K/UL (ref 0–0.2)
BASOPHILS RELATIVE PERCENT: 1 %
EOSINOPHILS ABSOLUTE: 0.3 K/UL (ref 0–0.6)
EOSINOPHILS RELATIVE PERCENT: 3.5 %
HCT VFR BLD CALC: 46.1 % (ref 36–48)
HEMOGLOBIN: 15.5 G/DL (ref 12–16)
LYMPHOCYTES ABSOLUTE: 1.7 K/UL (ref 1–5.1)
LYMPHOCYTES RELATIVE PERCENT: 23.2 %
MCH RBC QN AUTO: 30.4 PG (ref 26–34)
MCHC RBC AUTO-ENTMCNC: 33.5 G/DL (ref 31–36)
MCV RBC AUTO: 90.7 FL (ref 80–100)
MONOCYTES ABSOLUTE: 0.5 K/UL (ref 0–1.3)
MONOCYTES RELATIVE PERCENT: 7.3 %
NEUTROPHILS ABSOLUTE: 4.8 K/UL (ref 1.7–7.7)
NEUTROPHILS RELATIVE PERCENT: 65 %
PDW BLD-RTO: 13.3 % (ref 12.4–15.4)
PLATELET # BLD: 292 K/UL (ref 135–450)
PMV BLD AUTO: 8.8 FL (ref 5–10.5)
RBC # BLD: 5.08 M/UL (ref 4–5.2)
SEDIMENTATION RATE, ERYTHROCYTE: 10 MM/HR (ref 0–30)
WBC # BLD: 7.4 K/UL (ref 4–11)

## 2020-10-29 PROCEDURE — 90686 IIV4 VACC NO PRSV 0.5 ML IM: CPT | Performed by: INTERNAL MEDICINE

## 2020-10-29 PROCEDURE — 90472 IMMUNIZATION ADMIN EACH ADD: CPT | Performed by: INTERNAL MEDICINE

## 2020-10-29 PROCEDURE — 90471 IMMUNIZATION ADMIN: CPT | Performed by: INTERNAL MEDICINE

## 2020-10-29 PROCEDURE — 90750 HZV VACC RECOMBINANT IM: CPT | Performed by: INTERNAL MEDICINE

## 2020-10-29 PROCEDURE — 99396 PREV VISIT EST AGE 40-64: CPT | Performed by: INTERNAL MEDICINE

## 2020-10-29 RX ORDER — OLMESARTAN MEDOXOMIL 20 MG/1
20 TABLET ORAL DAILY
Qty: 90 TABLET | Refills: 3 | Status: SHIPPED | OUTPATIENT
Start: 2020-10-29 | End: 2021-09-21

## 2020-10-29 ASSESSMENT — PATIENT HEALTH QUESTIONNAIRE - PHQ9
SUM OF ALL RESPONSES TO PHQ QUESTIONS 1-9: 0
SUM OF ALL RESPONSES TO PHQ QUESTIONS 1-9: 0
1. LITTLE INTEREST OR PLEASURE IN DOING THINGS: 0
SUM OF ALL RESPONSES TO PHQ QUESTIONS 1-9: 0
SUM OF ALL RESPONSES TO PHQ9 QUESTIONS 1 & 2: 0
2. FEELING DOWN, DEPRESSED OR HOPELESS: 0

## 2020-10-29 NOTE — PROGRESS NOTES
Vaccine Information Sheet, \"Influenza - Inactivated\"  given to David Robins, or parent/legal guardian of  David Robins and verbalized understanding. Patient responses:    Have you ever had a reaction to a flu vaccine? No  Do you have any current illness? No  Have you ever had Guillian [place] Syndrome? No  Do you have a serious allergy to any of the follow: Neomycin, Polymyxin, Thimerosal, eggs or egg products? No    Flu vaccine given per order. Please see immunization tab. Risks and benefits explained. Current VIS given.       Immunizations Administered     Name Date Dose Route    Influenza, Quadv, IM, PF (6 mo and older Fluzone, Flulaval, Fluarix, and 3 yrs and older Afluria) 10/29/2020 0.5 mL Intramuscular    Site: Deltoid- Left    Lot: [id number]    NDC: 29436-466-16    Zoster Recombinant (Shingrix) 10/29/2020 0.5 mL Intramuscular    Site: Deltoid- Right    Lot: [id number]    NDC: 31877-064-21

## 2020-10-29 NOTE — PROGRESS NOTES
History and Physical      Nikky Jurado  YOB: 1965    Date of Service:  10/29/2020    Vitals:    10/29/20 1350   BP: 126/86   Pulse: 100   Resp: 14   Temp: 97.1 °F (36.2 °C)   TempSrc: Temporal   SpO2: 98%   Weight: 181 lb 6.4 oz (82.3 kg)   Height: 5' 8\" (1.727 m)     Body mass index is 27.58 kg/m². Wt Readings from Last 3 Encounters:   10/29/20 181 lb 6.4 oz (82.3 kg)   12/12/19 174 lb 6.4 oz (79.1 kg)   12/05/19 175 lb 3.2 oz (79.5 kg)     BP Readings from Last 3 Encounters:   10/29/20 126/86   12/12/19 130/88   12/05/19 132/82        Chief Complaint: Nikky Jurado is a 48 y.o. female who presents for complete physical examination. HPI:     She had Covid in July. Mostly fatigue and muscle aches. She got it from her boyfriend who is also sick with Covid. She has been left with fatigue and decreased exercise tolerance but overall it is getting better. The fatigue is more the desire to nap during the day and difficulty maintaining a higher level of energy. Mood is okay. Anxiety is well controlled on low-dose Effexor. Patient Active Problem List   Diagnosis    Essential hypertension    Bell's palsy    Acne    Anxiety, generalized    Plantar fasciitis of right foot    Abnormal mammogram of right breast    Palpitations    Snoring    Anal itching       No Known Allergies     Outpatient Medications Marked as Taking for the 10/29/20 encounter (Office Visit) with Ale Montana MD   Medication Sig Dispense Refill    olmesartan (BENICAR) 20 MG tablet TAKE 1 TABLET BY MOUTH  DAILY 90 tablet 3    venlafaxine (EFFEXOR XR) 37.5 MG extended release capsule TAKE 1 CAPSULE BY MOUTH  DAILY 90 capsule 1    tretinoin (RETIN-A) 0.1 % cream Apply topically nightly. 60 g 1    clobetasol (TEMOVATE) 0.05 % external solution Apply topically 2 times daily.  50 mL 1       Past Medical History:   Diagnosis Date    Abnormal Pap smear of cervix     Anxiety     Bell's palsy     Blepharitis  Cough due to ACE inhibitor 11/7/2012    Hypertension      Past Surgical History:   Procedure Laterality Date    BREAST ENHANCEMENT SURGERY  11/30/2016    LIPOSUCTION  11/10/2016     Family History   Problem Relation Age of Onset   Leary Hypertension Mother     Thyroid Disease Mother     Hypertension Sister     Thyroid Disease Sister         hashimotos    Other Sister         depression    Other Sister     Cancer Neg Hx     Asthma Neg Hx     Heart Failure Neg Hx     Rashes/Skin Problems Neg Hx     Seizures Neg Hx     Stroke Neg Hx        Review of Systems:  A comprehensive review of systems was negative except for what was noted in the HPI. Some occasional diarrhea. Physical Exam  Constitutional:       General: She is not in acute distress. Appearance: Normal appearance. She is well-developed. She is not diaphoretic. HENT:      Head: Normocephalic and atraumatic. Right Ear: Tympanic membrane, ear canal and external ear normal.      Left Ear: Tympanic membrane, ear canal and external ear normal.      Nose: Nose normal.      Mouth/Throat:      Pharynx: Oropharynx is clear. Eyes:      General: No scleral icterus. Conjunctiva/sclera: Conjunctivae normal.      Pupils: Pupils are equal, round, and reactive to light. Neck:      Musculoskeletal: Normal range of motion and neck supple. Thyroid: No thyromegaly. Vascular: No carotid bruit or JVD. Comments: No JVD at 90 deg  No thyromegaly  Cardiovascular:      Rate and Rhythm: Normal rate and regular rhythm. Pulses: Normal pulses. Heart sounds: Normal heart sounds. No murmur. No friction rub. No gallop. Pulmonary:      Effort: Pulmonary effort is normal.      Breath sounds: Normal breath sounds. No wheezing or rales. Abdominal:      General: Bowel sounds are normal. There is no distension. Palpations: Abdomen is soft. There is no mass. Tenderness: There is no abdominal tenderness.  There is no guarding or rebound. Musculoskeletal: Normal range of motion. General: No tenderness or deformity. Right lower leg: No edema. Left lower leg: No edema. Lymphadenopathy:      Cervical: No cervical adenopathy. Skin:     General: Skin is warm and dry. Findings: No lesion or rash. Neurological:      General: No focal deficit present. Mental Status: She is alert and oriented to person, place, and time. Cranial Nerves: No cranial nerve deficit (grossly intact). Sensory: No sensory deficit. Motor: No weakness. Coordination: Coordination normal.      Gait: Gait normal.   Psychiatric:         Mood and Affect: Mood normal.         Behavior: Behavior normal.          Lipid panel:  Lab Results   Component Value Date    CHOL 207 (H) 06/04/2019    TRIG 80 06/04/2019    HDL 53 06/04/2019    LDLCALC 138 (H) 06/04/2019     Glucose:   Glucose (mg/dL)   Date Value   06/04/2019 138 (H)       No results found for this visit on 10/29/20. Preventive Care:  Hx abnormal PAP: no  Self-breast exams: yes  Hx of abnormal mammogram: yes. No biopsy. Previous DEXA scan: no  Eye exam within the past 2 years: yes  Dental exam every 6 months: yes  Seatbelt used consistently:yes  Smoke and carbon monoxide detectors in home: yes   Exercise: yoga or pilates 5 days per week  Social History     Socioeconomic History    Marital status:      Spouse name: None    Number of children: 3    Years of education: BA    Highest education level: None   Occupational History    Occupation: approval coordinator     Comment: [employer]    Occupation:    Social Needs    Financial resource strain: None    Food insecurity     Worry: None     Inability: None    Transportation needs     Medical: None     Non-medical: None   Tobacco Use    Smoking status: Never Smoker    Smokeless tobacco: Never Used   Substance and Sexual Activity    Alcohol use:  Yes     Alcohol/week: 0.0 standard drinks     Comment: occasionally .  1 drink/ day    Drug use: No    Sexual activity: Yes     Partners: Male   Lifestyle    Physical activity     Days per week: None     Minutes per session: None    Stress: None   Relationships    Social connections     Talks on phone: None     Gets together: None     Attends Lutheran service: None     Active member of club or organization: None     Attends meetings of clubs or organizations: None     Relationship status: None    Intimate partner violence     Fear of current or ex partner: None     Emotionally abused: None     Physically abused: None     Forced sexual activity: None   Other Topics Concern    None   Social History Narrative    Lives in house with partner and 3 children ages 32, 13, 25    Oldest in [facility] - still in college    Daughter at [address] and Sexual Activity   Sexual Activity Yes    Partners: Male       Advance Directive: N, <no information>    Immunization History   Administered Date(s) Administered    Influenza Virus Vaccine 11/24/2014, 01/07/2016    Influenza, Quadv, IM, PF (6 mo and older Fluzone, Flulaval, Fluarix, and 3 yrs and older Afluria) 01/30/2017, 10/16/2017    Td, unspecified formulation 01/01/1996    Tdap (Boostrix, Adacel) 02/24/2012       Health Maintenance   Topic Date Due    Shingles Vaccine (1 of 2) 08/21/2016    Colon cancer screen colonoscopy  08/21/2016    Potassium monitoring  06/04/2020    Creatinine monitoring  06/04/2020    Flu vaccine (1) 09/01/2020    DTaP/Tdap/Td vaccine (2 - Td) 02/24/2022    Breast cancer screen  04/15/2022    Diabetes screen  06/04/2022    Lipid screen  06/04/2024    Cervical cancer screen  12/12/2024    Hepatitis A vaccine  Aged Out    Hepatitis B vaccine  Aged Out    Hib vaccine  Aged Out    Meningococcal (ACWY) vaccine  Aged Out    Pneumococcal 0-64 years Vaccine  Aged Out    HIV screen  Discontinued Assessment/Plan:   1. Annual physical exam  Labs as below  Think her fatigue likely related to COVID recovery but will check labs  Continue gentle activity  BP ULN here today - check K/Cr. Flu and shingrix #1 today  Sun protection  UTD on mammo, pap  - Lipid Panel; Future  - Comprehensive Metabolic Panel; Future  - Hemoglobin A1C; Future  - CBC Auto Differential; Future  - TSH with Reflex; Future    2. Need for influenza vaccination  - INFLUENZA, QUADV, 3 YRS AND OLDER, IM PF, PREFILL SYR OR SDV, 0.5ML (AFLURIA QUADV, PF)    3. Need for shingles vaccine  - Zoster recombinant [facility])    4. Other fatigue  - Comprehensive Metabolic Panel; Future  - CBC Auto Differential; Future  - TSH with Reflex; Future  - SEDIMENTATION RATE; Future  - Vitamin B12; Future  - Vitamin D 25 Hydroxy; Future    5. Encounter for lipid screening for cardiovascular disease  - Lipid Panel; Future    6. Screening for diabetes mellitus (DM)  - Hemoglobin A1C; Future     Will wait on colon cancer screening until after pandemic. She has had a colonoscopy in the past.     Return in about 6 months (around 4/29/2021) for chronic conditions.

## 2020-10-30 LAB
A/G RATIO: 1.8 (ref 1.1–2.2)
ALBUMIN SERPL-MCNC: 4.6 G/DL (ref 3.4–5)
ALP BLD-CCNC: 64 U/L (ref 40–129)
ALT SERPL-CCNC: 33 U/L (ref 10–40)
ANION GAP SERPL CALCULATED.3IONS-SCNC: 11 MMOL/L (ref 3–16)
AST SERPL-CCNC: 20 U/L (ref 15–37)
BILIRUB SERPL-MCNC: 0.4 MG/DL (ref 0–1)
BUN BLDV-MCNC: 11 MG/DL (ref 7–20)
CALCIUM SERPL-MCNC: 10.1 MG/DL (ref 8.3–10.6)
CHLORIDE BLD-SCNC: 102 MMOL/L (ref 99–110)
CHOLESTEROL, TOTAL: 238 MG/DL (ref 0–199)
CO2: 26 MMOL/L (ref 21–32)
CREAT SERPL-MCNC: 0.8 MG/DL (ref 0.6–1.1)
ESTIMATED AVERAGE GLUCOSE: 105.4 MG/DL
GFR AFRICAN AMERICAN: >60
GFR NON-AFRICAN AMERICAN: >60
GLOBULIN: 2.5 G/DL
GLUCOSE BLD-MCNC: 114 MG/DL (ref 70–99)
HBA1C MFR BLD: 5.3 %
HDLC SERPL-MCNC: 56 MG/DL (ref 40–60)
LDL CHOLESTEROL CALCULATED: 154 MG/DL
POTASSIUM SERPL-SCNC: 4.6 MMOL/L (ref 3.5–5.1)
SODIUM BLD-SCNC: 139 MMOL/L (ref 136–145)
TOTAL PROTEIN: 7.1 G/DL (ref 6.4–8.2)
TRIGL SERPL-MCNC: 140 MG/DL (ref 0–150)
TSH REFLEX: 2.09 UIU/ML (ref 0.27–4.2)
VITAMIN B-12: 471 PG/ML (ref 211–911)
VITAMIN D 25-HYDROXY: 37.4 NG/ML
VLDLC SERPL CALC-MCNC: 28 MG/DL

## 2020-11-23 RX ORDER — VENLAFAXINE HYDROCHLORIDE 37.5 MG/1
37.5 CAPSULE, EXTENDED RELEASE ORAL DAILY
Qty: 90 CAPSULE | Refills: 3 | Status: SHIPPED | OUTPATIENT
Start: 2020-11-23 | End: 2021-10-14

## 2021-03-25 ENCOUNTER — IMMUNIZATION (OUTPATIENT)
Dept: FAMILY MEDICINE CLINIC | Age: 56
End: 2021-03-25
Payer: COMMERCIAL

## 2021-03-25 PROCEDURE — 91300 COVID-19, PFIZER VACCINE 30MCG/0.3ML DOSE: CPT | Performed by: FAMILY MEDICINE

## 2021-03-25 PROCEDURE — 0001A COVID-19, PFIZER VACCINE 30MCG/0.3ML DOSE: CPT | Performed by: FAMILY MEDICINE

## 2021-04-15 ENCOUNTER — IMMUNIZATION (OUTPATIENT)
Dept: FAMILY MEDICINE CLINIC | Age: 56
End: 2021-04-15
Payer: COMMERCIAL

## 2021-04-15 PROCEDURE — 91300 COVID-19, PFIZER VACCINE 30MCG/0.3ML DOSE: CPT | Performed by: FAMILY MEDICINE

## 2021-04-15 PROCEDURE — 0002A COVID-19, PFIZER VACCINE 30MCG/0.3ML DOSE: CPT | Performed by: FAMILY MEDICINE

## 2021-05-06 ENCOUNTER — TELEPHONE (OUTPATIENT)
Dept: INTERNAL MEDICINE CLINIC | Age: 56
End: 2021-05-06

## 2021-05-06 NOTE — TELEPHONE ENCOUNTER
Called pt to advise if 15 min or more late, she will need to reschedule. Patient said she was still 10 mins away (which would make her 20 mins late).  Patient driving so will call back to r/s

## 2021-05-06 NOTE — TELEPHONE ENCOUNTER
----- Message from [facility] sent at 5/6/2021  2:15 PM EDT -----  Subject: Message to Provider    QUESTIONS  Information for Provider? pt said she will be about 15 minutes late to her   appt today and she is trying to make it there as soon as she can.   ---------------------------------------------------------------------------  --------------  [address]  What is the best way for the office to contact you? OK to leave message on   voicemail  Preferred Call Back Phone Number? [phone]  ---------------------------------------------------------------------------  --------------  SCRIPT ANSWERS  Relationship to Patient?  Self

## 2021-05-10 ENCOUNTER — OFFICE VISIT (OUTPATIENT)
Dept: INTERNAL MEDICINE CLINIC | Age: 56
End: 2021-05-10
Payer: COMMERCIAL

## 2021-05-10 VITALS
OXYGEN SATURATION: 98 % | DIASTOLIC BLOOD PRESSURE: 70 MMHG | HEIGHT: 68 IN | HEART RATE: 75 BPM | SYSTOLIC BLOOD PRESSURE: 124 MMHG | RESPIRATION RATE: 14 BRPM | WEIGHT: 183.6 LBS | BODY MASS INDEX: 27.83 KG/M2

## 2021-05-10 DIAGNOSIS — Z00.00 ANNUAL PHYSICAL EXAM: Primary | ICD-10-CM

## 2021-05-10 DIAGNOSIS — Z23 NEED FOR VACCINATION: ICD-10-CM

## 2021-05-10 DIAGNOSIS — I10 ESSENTIAL HYPERTENSION: ICD-10-CM

## 2021-05-10 DIAGNOSIS — Z98.82 H/O BILATERAL BREAST IMPLANTS: ICD-10-CM

## 2021-05-10 DIAGNOSIS — Z12.31 ENCOUNTER FOR SCREENING MAMMOGRAM FOR MALIGNANT NEOPLASM OF BREAST: ICD-10-CM

## 2021-05-10 PROCEDURE — 99396 PREV VISIT EST AGE 40-64: CPT | Performed by: INTERNAL MEDICINE

## 2021-05-10 PROCEDURE — 90471 IMMUNIZATION ADMIN: CPT | Performed by: INTERNAL MEDICINE

## 2021-05-10 PROCEDURE — 90750 HZV VACC RECOMBINANT IM: CPT | Performed by: INTERNAL MEDICINE

## 2021-05-10 ASSESSMENT — PATIENT HEALTH QUESTIONNAIRE - PHQ9
2. FEELING DOWN, DEPRESSED OR HOPELESS: 0
1. LITTLE INTEREST OR PLEASURE IN DOING THINGS: 0
SUM OF ALL RESPONSES TO PHQ9 QUESTIONS 1 & 2: 0
SUM OF ALL RESPONSES TO PHQ QUESTIONS 1-9: 0
SUM OF ALL RESPONSES TO PHQ QUESTIONS 1-9: 0

## 2021-05-10 NOTE — PROGRESS NOTES
History:   Procedure Laterality Date    BREAST ENHANCEMENT SURGERY  11/30/2016    LIPOSUCTION  11/10/2016     Family History   Problem Relation Age of Onset   Elieser Jitendra Hypertension Mother     Thyroid Disease Mother     Hypertension Sister     Thyroid Disease Sister         hashimotos    Other Sister         depression    Other Sister     Cancer Neg Hx     Asthma Neg Hx     Heart Failure Neg Hx     Rashes/Skin Problems Neg Hx     Seizures Neg Hx     Stroke Neg Hx        Review of Systems:  A comprehensive review of systems was negative except for what was noted in the HPI. Physical Exam     Lipid panel:  Lab Results   Component Value Date    CHOL 238 (H) 10/29/2020    TRIG 140 10/29/2020    HDL 56 10/29/2020    LDLCALC 154 (H) 10/29/2020     Glucose:   Glucose (mg/dL)   Date Value   10/29/2020 114 (H)       No results found for this visit on 05/10/21. Preventive Care:  Hx abnormal PAP: no  Self-breast exams: yes  Hx of abnormal mammogram: yes - multiple  Previous DEXA scan: No  Eye exam within the past 2 years: yes  Dental exam every 6 months: yes  Exercise: Trying to get back to regular routine  Social History     Socioeconomic History    Marital status:      Spouse name: None    Number of children: 3    Years of education: BA    Highest education level: None   Occupational History    Occupation: approval coordinator     Comment: [employer]    Occupation:    Social Needs    Financial resource strain: None    Food insecurity     Worry: None     Inability: None    Transportation needs     Medical: None     Non-medical: None   Tobacco Use    Smoking status: Never Smoker    Smokeless tobacco: Never Used   Substance and Sexual Activity    Alcohol use: Yes     Alcohol/week: 0.0 standard drinks     Comment: occasionally .  1 drink/ day    Drug use: No    Sexual activity: Yes     Partners: Male   Lifestyle    Physical activity     Days per week: None     Minutes per session: None    Stress: None   Relationships    Social connections     Talks on phone: None     Gets together: None     Attends Mandaeism service: None     Active member of club or organization: None     Attends meetings of clubs or organizations: None     Relationship status: None    Intimate partner violence     Fear of current or ex partner: None     Emotionally abused: None     Physically abused: None     Forced sexual activity: None   Other Topics Concern    None   Social History Narrative    Lives in house with partner and 3 children ages 32, 13, 25    Oldest in [facility] - still in college    Daughter at [address] and Sexual Activity   Sexual Activity Yes    Partners: Male       Advance Directive: N, <no information>    Immunization History   Administered Date(s) Administered    COVID-19, [employer], PF, 30mcg/0.3mL 03/25/2021, 04/15/2021    Influenza Virus Vaccine 11/24/2014, 01/07/2016    Influenza, Quadv, IM, PF (6 mo and older Fluzone, Flulaval, Fluarix, and 3 yrs and older Afluria) 01/30/2017, 10/16/2017, 10/29/2020    Td, unspecified formulation 01/01/1996    Tdap (Boostrix, Adacel) 02/24/2012    Zoster Recombinant (Shingrix) 10/29/2020       Health Maintenance   Topic Date Due    Hepatitis C screen  Never done    Colon cancer screen colonoscopy  Never done    Shingles Vaccine (2 of 2) 12/24/2020    Potassium monitoring  10/29/2021    Creatinine monitoring  10/29/2021    DTaP/Tdap/Td vaccine (2 - Td) 02/24/2022    Breast cancer screen  04/15/2022    Diabetes screen  10/29/2023    Cervical cancer screen  12/12/2024    Lipid screen  10/29/2025    Flu vaccine  Completed    COVID-19 Vaccine  Completed    Hepatitis A vaccine  Aged Out    Hepatitis B vaccine  Aged Out    Hib vaccine  Aged Out    Meningococcal (ACWY) vaccine  Aged Out    Pneumococcal 0-64 years Vaccine  Aged Out    HIV screen  Discontinued Assessment/Plan:  1. Annual physical exam  Care gaps identified and addressed  Shingrix #2 today  Sun protection discussed  Will schedule her colonoscopy. Mammogram ordered  2. Essential hypertension  3. Encounter for screening mammogram for malignant neoplasm of breast  -     RAMU DIGITAL DIAGNOSTIC W OR WO CAD BILATERAL; Future  4. H/O bilateral breast implants  -     RAMU DIGITAL DIAGNOSTIC W OR WO CAD BILATERAL; Future  5. Need for vaccination  -     Zoster recombinant [facility])       Return in about 6 months (around 11/10/2021) for chronic conditions.

## 2021-07-09 ENCOUNTER — HOSPITAL ENCOUNTER (OUTPATIENT)
Dept: MAMMOGRAPHY | Age: 56
Discharge: HOME | End: 2021-07-09
Payer: COMMERCIAL

## 2021-07-09 VITALS — BODY MASS INDEX: 27.74 KG/M2 | HEIGHT: 68 IN | WEIGHT: 183 LBS

## 2021-07-09 DIAGNOSIS — Z98.82 H/O BILATERAL BREAST IMPLANTS: ICD-10-CM

## 2021-07-09 DIAGNOSIS — Z12.31 ENCOUNTER FOR SCREENING MAMMOGRAM FOR MALIGNANT NEOPLASM OF BREAST: ICD-10-CM

## 2021-07-09 PROCEDURE — 77063 BREAST TOMOSYNTHESIS BI: CPT

## 2021-09-20 DIAGNOSIS — I10 ESSENTIAL HYPERTENSION: ICD-10-CM

## 2021-09-21 RX ORDER — OLMESARTAN MEDOXOMIL 20 MG/1
20 TABLET ORAL DAILY
Qty: 90 TABLET | Refills: 3 | Status: SHIPPED | OUTPATIENT
Start: 2021-09-21 | End: 2022-09-01 | Stop reason: SDUPTHER

## 2021-10-12 DIAGNOSIS — F41.1 ANXIETY, GENERALIZED: ICD-10-CM

## 2021-10-13 NOTE — TELEPHONE ENCOUNTER
Last appointment: 5/10/2021  Next appointment: 11/18/2021  Last refill: 11/23/2020, 90 +3      [employer], [facility]

## 2021-10-14 RX ORDER — VENLAFAXINE HYDROCHLORIDE 37.5 MG/1
37.5 CAPSULE, EXTENDED RELEASE ORAL DAILY
Qty: 90 CAPSULE | Refills: 3 | Status: SHIPPED | OUTPATIENT
Start: 2021-10-14 | End: 2022-09-01 | Stop reason: SDUPTHER

## 2021-11-18 ENCOUNTER — OFFICE VISIT (OUTPATIENT)
Dept: INTERNAL MEDICINE CLINIC | Age: 56
End: 2021-11-18
Payer: COMMERCIAL

## 2021-11-18 VITALS
DIASTOLIC BLOOD PRESSURE: 80 MMHG | BODY MASS INDEX: 27.8 KG/M2 | HEART RATE: 68 BPM | SYSTOLIC BLOOD PRESSURE: 124 MMHG | WEIGHT: 183.4 LBS | TEMPERATURE: 97 F | OXYGEN SATURATION: 98 % | HEIGHT: 68 IN

## 2021-11-18 DIAGNOSIS — F41.1 ANXIETY, GENERALIZED: ICD-10-CM

## 2021-11-18 DIAGNOSIS — Z12.11 COLON CANCER SCREENING: ICD-10-CM

## 2021-11-18 DIAGNOSIS — M54.2 NECK PAIN: ICD-10-CM

## 2021-11-18 DIAGNOSIS — Z13.1 SCREENING FOR DIABETES MELLITUS (DM): ICD-10-CM

## 2021-11-18 DIAGNOSIS — Z13.6 ENCOUNTER FOR LIPID SCREENING FOR CARDIOVASCULAR DISEASE: ICD-10-CM

## 2021-11-18 DIAGNOSIS — Z13.220 ENCOUNTER FOR LIPID SCREENING FOR CARDIOVASCULAR DISEASE: ICD-10-CM

## 2021-11-18 DIAGNOSIS — Z23 NEED FOR VACCINATION: ICD-10-CM

## 2021-11-18 DIAGNOSIS — Z11.59 ENCOUNTER FOR HEPATITIS C SCREENING TEST FOR LOW RISK PATIENT: ICD-10-CM

## 2021-11-18 DIAGNOSIS — N92.0 MENORRHAGIA WITH REGULAR CYCLE: ICD-10-CM

## 2021-11-18 DIAGNOSIS — I10 ESSENTIAL HYPERTENSION: Primary | ICD-10-CM

## 2021-11-18 LAB
A/G RATIO: 1.6 (ref 1.1–2.2)
ALBUMIN SERPL-MCNC: 4.6 G/DL (ref 3.4–5)
ALP BLD-CCNC: 62 U/L (ref 40–129)
ALT SERPL-CCNC: 39 U/L (ref 10–40)
ANION GAP SERPL CALCULATED.3IONS-SCNC: 14 MMOL/L (ref 3–16)
AST SERPL-CCNC: 20 U/L (ref 15–37)
BASOPHILS ABSOLUTE: 0.1 K/UL (ref 0–0.2)
BASOPHILS RELATIVE PERCENT: 0.8 %
BILIRUB SERPL-MCNC: 0.6 MG/DL (ref 0–1)
BUN BLDV-MCNC: 15 MG/DL (ref 7–20)
CALCIUM SERPL-MCNC: 9.6 MG/DL (ref 8.3–10.6)
CHLORIDE BLD-SCNC: 101 MMOL/L (ref 99–110)
CHOLESTEROL, TOTAL: 200 MG/DL (ref 0–199)
CO2: 23 MMOL/L (ref 21–32)
CREAT SERPL-MCNC: 0.8 MG/DL (ref 0.6–1.1)
EOSINOPHILS ABSOLUTE: 0.3 K/UL (ref 0–0.6)
EOSINOPHILS RELATIVE PERCENT: 3.9 %
GFR AFRICAN AMERICAN: >60
GFR NON-AFRICAN AMERICAN: >60
GLUCOSE BLD-MCNC: 87 MG/DL (ref 70–99)
HCT VFR BLD CALC: 45.7 % (ref 36–48)
HDLC SERPL-MCNC: 51 MG/DL (ref 40–60)
HEMOGLOBIN: 15.1 G/DL (ref 12–16)
HEPATITIS C ANTIBODY INTERPRETATION: NORMAL
LDL CHOLESTEROL CALCULATED: 130 MG/DL
LYMPHOCYTES ABSOLUTE: 1.9 K/UL (ref 1–5.1)
LYMPHOCYTES RELATIVE PERCENT: 25.5 %
MCH RBC QN AUTO: 30.1 PG (ref 26–34)
MCHC RBC AUTO-ENTMCNC: 33.1 G/DL (ref 31–36)
MCV RBC AUTO: 91 FL (ref 80–100)
MONOCYTES ABSOLUTE: 0.6 K/UL (ref 0–1.3)
MONOCYTES RELATIVE PERCENT: 8.7 %
NEUTROPHILS ABSOLUTE: 4.5 K/UL (ref 1.7–7.7)
NEUTROPHILS RELATIVE PERCENT: 61.1 %
PDW BLD-RTO: 12.9 % (ref 12.4–15.4)
PLATELET # BLD: 239 K/UL (ref 135–450)
PMV BLD AUTO: 8.7 FL (ref 5–10.5)
POTASSIUM SERPL-SCNC: 4.3 MMOL/L (ref 3.5–5.1)
RBC # BLD: 5.03 M/UL (ref 4–5.2)
SODIUM BLD-SCNC: 138 MMOL/L (ref 136–145)
TOTAL PROTEIN: 7.5 G/DL (ref 6.4–8.2)
TRIGL SERPL-MCNC: 93 MG/DL (ref 0–150)
TSH REFLEX: 1.47 UIU/ML (ref 0.27–4.2)
VLDLC SERPL CALC-MCNC: 19 MG/DL
WBC # BLD: 7.3 K/UL (ref 4–11)

## 2021-11-18 PROCEDURE — 90674 CCIIV4 VAC NO PRSV 0.5 ML IM: CPT | Performed by: INTERNAL MEDICINE

## 2021-11-18 PROCEDURE — 99214 OFFICE O/P EST MOD 30 MIN: CPT | Performed by: INTERNAL MEDICINE

## 2021-11-18 PROCEDURE — 90471 IMMUNIZATION ADMIN: CPT | Performed by: INTERNAL MEDICINE

## 2021-11-18 ASSESSMENT — ENCOUNTER SYMPTOMS
ABDOMINAL PAIN: 0
SHORTNESS OF BREATH: 0

## 2021-11-18 NOTE — PROGRESS NOTES
Redd Hastings (:  1965) is a 55 y.o. female,Established patient, here for evaluation of the following chief complaint(s):  6 Month Follow-Up (neck pain )      ASSESSMENT/PLAN:  1. Essential hypertension  Assessment & Plan:  BP at goal.   CMP,CBC, TSH  Olmesartan 20  Orders:  -     Comprehensive Metabolic Panel; Future  2. Encounter for hepatitis C screening test for low risk patient  -     Hepatitis C Antibody; Future  3. Encounter for lipid screening for cardiovascular disease  -     Lipid Panel; Future  4. Screening for diabetes mellitus (DM)  -     Hemoglobin A1C; Future  5. Colon cancer screening  -     AFL - Sury Yun MD, Gastroenterology, [place]  6. Menorrhagia with regular cycle  -     CBC Auto Differential; Future  -     TSH with Reflex; Future  7. Need for vaccination  -     INFLUENZA, MDCK QUADV, 2 YRS AND OLDER, IM, PF, PREFILL SYR OR SDV, 0.5ML (FLUCELVAX QUADV, PF)  8. Anxiety, generalized  Assessment & Plan: On low dose effexor. 9. Neck pain  Comments:  No radicular sxs. Discussed limited utility of xrays. Trial naprosyn and stretching. Rec'd COVID booster. Return in about 6 months (around 2022) for Physical.    SUBJECTIVE/OBJECTIVE:  HPI    Here for 6 month follow up -  HTN - taking her olmesartan 0mg daily without issue  On effexor for hot flushes and anxiety which helps some. Still having periods. They are getting heavier over time    Stiffness in the neck. No radiation into the arms. No numbness or tingling. No weakness. Has been working on positioning at home with work. Tried a chiropractor but was not helpful. Took advil but bothered her stomach. Review of Systems   Constitutional: Negative for chills, fatigue and fever. Respiratory: Negative for shortness of breath. Cardiovascular: Negative for chest pain. Gastrointestinal: Negative for abdominal pain. Musculoskeletal: Positive for neck pain and neck stiffness.  Negative for arthralgias and joint swelling. Psychiatric/Behavioral: Negative for dysphoric mood. The patient is not nervous/anxious. Current Outpatient Medications on File Prior to Visit   Medication Sig Dispense Refill    venlafaxine (EFFEXOR XR) 37.5 MG extended release capsule TAKE 1 CAPSULE BY MOUTH  DAILY 90 capsule 3    olmesartan (BENICAR) 20 MG tablet TAKE 1 TABLET BY MOUTH  DAILY 90 tablet 3     No current facility-administered medications on file prior to visit. Vitals:    11/18/21 1306   BP: 124/80   Pulse: 68   Temp: 97 °F (36.1 °C)   SpO2: 98%     Physical Exam  Constitutional:       General: She is not in acute distress. Appearance: Normal appearance. She is not diaphoretic. HENT:      Head: Normocephalic and atraumatic. Right Ear: External ear normal.      Left Ear: External ear normal.      Nose: Nose normal.      Mouth/Throat:      Mouth: Mucous membranes are moist.      Pharynx: Oropharynx is clear. Eyes:      General: No scleral icterus. Conjunctiva/sclera: Conjunctivae normal.   Cardiovascular:      Rate and Rhythm: Normal rate and regular rhythm. Pulses: Normal pulses. Heart sounds: Normal heart sounds. No murmur heard. No friction rub. No gallop. Pulmonary:      Effort: Pulmonary effort is normal.      Breath sounds: Normal breath sounds. No wheezing, rhonchi or rales. Abdominal:      General: Abdomen is flat. Bowel sounds are normal.   Musculoskeletal:         General: No deformity. Cervical back: Normal range of motion. Right lower leg: No edema. Left lower leg: No edema. Skin:     General: Skin is warm and dry. Findings: No lesion or rash. Neurological:      General: No focal deficit present. Mental Status: She is alert.       Coordination: Coordination normal.      Gait: Gait normal.   Psychiatric:         Mood and Affect: Mood normal.         Behavior: Behavior normal.           On this date 11/18/2021 I have spent 30 minutes reviewing previous notes, test results and face to face with the patient discussing the diagnosis and importance of compliance with the treatment plan as well as documenting on the day of the visit. An electronic signature was used to authenticate this note.     --Karen Dao MD

## 2021-11-19 LAB
ESTIMATED AVERAGE GLUCOSE: 102.5 MG/DL
HBA1C MFR BLD: 5.2 %

## 2022-09-01 DIAGNOSIS — F41.1 ANXIETY, GENERALIZED: ICD-10-CM

## 2022-09-01 DIAGNOSIS — I10 ESSENTIAL HYPERTENSION: ICD-10-CM

## 2022-09-01 RX ORDER — OLMESARTAN MEDOXOMIL 20 MG/1
20 TABLET ORAL DAILY
Qty: 90 TABLET | Refills: 1 | Status: SHIPPED | OUTPATIENT
Start: 2022-09-01

## 2022-09-01 RX ORDER — VENLAFAXINE HYDROCHLORIDE 37.5 MG/1
37.5 CAPSULE, EXTENDED RELEASE ORAL DAILY
Qty: 90 CAPSULE | Refills: 1 | Status: SHIPPED | OUTPATIENT
Start: 2022-09-01

## 2022-09-01 NOTE — TELEPHONE ENCOUNTER
Patient is requesting a refill for the following prescription:    olmesartan (BENICAR) 20 MG tablet  Last appointment: 11/18/2021  Next appointment: Visit date not found  Last refill: 09/21/2021      venlafaxine (EFFEXOR XR) 37.5 MG extended release capsule (patient states she is almost out so would like refill in advance)   Last appointment: 11/18/2021  Next appointment: Visit date not found  Last refill: 10/14/2021    Patient is requesting a 6 month supply be called in for these medications if possible. Patient would like these to be called in to: [facility] [id number] [facility] [address] - f [phone]     Patient made aware to allow 24 to 48 business hours for prescription refills. Please contact patient @ phone # provided.

## 2022-10-18 ENCOUNTER — OFFICE VISIT (OUTPATIENT)
Dept: INTERNAL MEDICINE CLINIC | Age: 57
End: 2022-10-18
Payer: COMMERCIAL

## 2022-10-18 VITALS
HEART RATE: 68 BPM | WEIGHT: 176.6 LBS | TEMPERATURE: 97.3 F | BODY MASS INDEX: 26.76 KG/M2 | HEIGHT: 68 IN | OXYGEN SATURATION: 98 % | DIASTOLIC BLOOD PRESSURE: 86 MMHG | SYSTOLIC BLOOD PRESSURE: 136 MMHG

## 2022-10-18 DIAGNOSIS — Z23 NEED FOR INFLUENZA VACCINATION: ICD-10-CM

## 2022-10-18 DIAGNOSIS — H60.502 ACUTE OTITIS EXTERNA OF LEFT EAR, UNSPECIFIED TYPE: Primary | ICD-10-CM

## 2022-10-18 PROCEDURE — 90471 IMMUNIZATION ADMIN: CPT | Performed by: INTERNAL MEDICINE

## 2022-10-18 PROCEDURE — 99213 OFFICE O/P EST LOW 20 MIN: CPT | Performed by: INTERNAL MEDICINE

## 2022-10-18 PROCEDURE — 90674 CCIIV4 VAC NO PRSV 0.5 ML IM: CPT | Performed by: INTERNAL MEDICINE

## 2022-10-18 SDOH — ECONOMIC STABILITY: FOOD INSECURITY: WITHIN THE PAST 12 MONTHS, YOU WORRIED THAT YOUR FOOD WOULD RUN OUT BEFORE YOU GOT MONEY TO BUY MORE.: NEVER TRUE

## 2022-10-18 SDOH — ECONOMIC STABILITY: FOOD INSECURITY: WITHIN THE PAST 12 MONTHS, THE FOOD YOU BOUGHT JUST DIDN'T LAST AND YOU DIDN'T HAVE MONEY TO GET MORE.: NEVER TRUE

## 2022-10-18 ASSESSMENT — PATIENT HEALTH QUESTIONNAIRE - PHQ9
1. LITTLE INTEREST OR PLEASURE IN DOING THINGS: 0
SUM OF ALL RESPONSES TO PHQ QUESTIONS 1-9: 0
SUM OF ALL RESPONSES TO PHQ QUESTIONS 1-9: 0
2. FEELING DOWN, DEPRESSED OR HOPELESS: 0
SUM OF ALL RESPONSES TO PHQ QUESTIONS 1-9: 0
SUM OF ALL RESPONSES TO PHQ9 QUESTIONS 1 & 2: 0
SUM OF ALL RESPONSES TO PHQ QUESTIONS 1-9: 0

## 2022-10-18 ASSESSMENT — SOCIAL DETERMINANTS OF HEALTH (SDOH): HOW HARD IS IT FOR YOU TO PAY FOR THE VERY BASICS LIKE FOOD, HOUSING, MEDICAL CARE, AND HEATING?: NOT HARD AT ALL

## 2022-10-18 NOTE — PROGRESS NOTES
Laine Crowe   :  1965  10/18/2022    ASSESSMENT/PLAN:   1. Acute otitis externa of left ear, unspecified type  Small ear canals, with limited exam, appears to be a fungal otitis. Symptoms relatively mild, so we will hold off treatment and instead have patient see ENT tomorrow to allow for better exam, as well as appropriate topical treatment in office if indeed this is fungal.    Tylenol prn tonight. Flu shot today. SUBJECTIVE     65 y.o. female established patient here for:   Chief Complaint   Patient presents with    Otalgia     Patient with left ear pain for several days. Feels full and somewhat tender, with itching as well. Not having any drainage. 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 Usually sees [facility]. Venus Becerra at [facility].  Was unable to get them. Has not had any problems in 2 to 3 years. Review of Systems    Outpatient Medications Marked as Taking for the 10/18/22 encounter (Office Visit) with Chen Barksdale MD   Medication Sig Dispense Refill    venlafaxine (EFFEXOR XR) 37.5 MG extended release capsule Take 1 capsule by mouth daily 90 capsule 1    olmesartan (BENICAR) 20 MG tablet Take 1 tablet by mouth daily 90 tablet 1       OBJECTIVE:  Vitals:    10/18/22 1156   BP: 136/86   Pulse: 68   Temp: 97.3 °F (36.3 °C)   TempSrc: Temporal   SpO2: 98%   Weight: 176 lb 9.6 oz (80.1 kg)   Height: 5' 8\" (1.727 m)     Physical Exam  HENT:      Ears:      Comments: Small ear canals, difficult otoscopy. Canal appears normal on the right. On the left there is swelling and pale mucosa, possibly exudate although it remains patent. I am unable to view the tympanic membrane. No cerumen visualized. Mouth/Throat:      Pharynx: Oropharynx is clear. Lymphadenopathy:      Cervical: No cervical adenopathy. This note was generated completely or in part utilizing Dragon dictation speech recognition software.   Occasionally, words are mistranscribed and despite editing, the text may contain inaccuracies due to incorrect word recognition.   If further clarification is needed please contact the office at [phone]  --Armando Gandara MD

## 2022-10-19 ENCOUNTER — OFFICE VISIT (OUTPATIENT)
Dept: ENT CLINIC | Age: 57
End: 2022-10-19
Payer: COMMERCIAL

## 2022-10-19 VITALS
WEIGHT: 174.4 LBS | SYSTOLIC BLOOD PRESSURE: 137 MMHG | BODY MASS INDEX: 26.43 KG/M2 | TEMPERATURE: 96.6 F | HEIGHT: 68 IN | HEART RATE: 79 BPM | DIASTOLIC BLOOD PRESSURE: 83 MMHG

## 2022-10-19 DIAGNOSIS — H61.23 BILATERAL IMPACTED CERUMEN: ICD-10-CM

## 2022-10-19 DIAGNOSIS — H61.309 STENOSIS OF EXTERNAL EAR CANAL: ICD-10-CM

## 2022-10-19 DIAGNOSIS — H90.2 EXTERNAL EAR CONDUCTIVE HEARING LOSS: Primary | ICD-10-CM

## 2022-10-19 PROCEDURE — 99243 OFF/OP CNSLTJ NEW/EST LOW 30: CPT | Performed by: OTOLARYNGOLOGY

## 2022-10-19 PROCEDURE — 69210 REMOVE IMPACTED EAR WAX UNI: CPT | Performed by: OTOLARYNGOLOGY

## 2022-10-19 NOTE — PROGRESS NOTES
CHIEF COMPLAINT: Fullness in the left ear. HISTORY OF PRESENT ILLNESS:  65 y.o. female referred by Dr. Brenton Dodd who presents with 1 month history of fullness in the left ear. The hearing is muffled. There is no ear pain there is no otorrhea. Onset not related to respiratory infection. The patient has congenitally stenotic ear canals and has to attend to this on a regular basis. Last seen in another office 2 years ago. PAST MEDICAL HISTORY:   Social History     Tobacco Use   Smoking Status Never   Smokeless Tobacco Never                                                    Social History     Substance and Sexual Activity   Alcohol Use Yes    Alcohol/week: 0.0 standard drinks    Comment: occasionally . 1 drink/ day                                                    Current Outpatient Medications:     venlafaxine (EFFEXOR XR) 37.5 MG extended release capsule, Take 1 capsule by mouth daily, Disp: 90 capsule, Rfl: 1    olmesartan (BENICAR) 20 MG tablet, Take 1 tablet by mouth daily, Disp: 90 tablet, Rfl: 1                                                 Past Medical History:   Diagnosis Date    Abnormal Pap smear of cervix     Anxiety     Bell's palsy     Blepharitis     Cough due to ACE inhibitor 11/07/2012    Hearing loss     Hypertension                                                     Past Surgical History:   Procedure Laterality Date    BREAST ENHANCEMENT SURGERY  11/30/2016    LIPOSUCTION  11/10/2016     FAMILY HISTORY: Family history reviewed. Except as noted in history of present illness, there is no pertinent family history      REVIEW OF SYSTEMS:  All pertinent positive and negative review of systems included in HPI. Otherwise, all systems are reviewed and negative.     PHYSICAL EXAMINATION:   GENERAL: wdwn- no acute distress  RESPIRATORY:  No stridor or respiratory distress  COMMUNICATION :  Normal voice  MENTAL STATUS:  Mood and affect normal, oriented X 3  HEAD AND FACE:  No abnormalities of the skin of face or head  EXTERNAL EARS AND NOSE:  Normal pinnae bilateral  FACIAL MUSCLES:  All branches of facial nerve intact  EXTRAOCULAR MUSCLES: Intact with full range of motion  FACE PALPATION:  No tenderness over sinuses. Zygomatic arches and orbital rims intact  OTOSCOPY: Cerumen occludes the left external auditory canal.  There is some cerumen on the right as well. TUNING FORKS: Rinne ++ Jesus midline at 512 Hz  INTRANASAL:  Septum midline, turbinates normal, meati clear. LIPS, TEETH, GINGIVA:  Normal mucosa  PHARYNX:  Normal  NECK:  No masses. LYMPHATIC:  No cervical adenopathy  SALIVARY GLANDS:  No swelling or masses in the parotid or submandibular salivary glands  THYROID:  No goiter or thyroid masses. IMPRESSION: Fullness in the ear with stenotic ear canals due to impacted cerumen. PLAN: Cerumen removed from the right ear with an alligator forceps. The cerumen in the left ear is quite impacted and required the microscope as well as the use of hydrogen peroxide to soften the wax to allow its removal.  Both tympanic membranes and middle ear spaces are normal.  Patient also has occasional itching in her ears and I recommend alcohol/vinegar eardrops for this. FOLLOW-UP: As needed.

## 2022-12-20 ENCOUNTER — TELEPHONE (OUTPATIENT)
Dept: INTERNAL MEDICINE CLINIC | Age: 57
End: 2022-12-20

## 2022-12-20 DIAGNOSIS — Z12.31 ENCOUNTER FOR SCREENING MAMMOGRAM FOR MALIGNANT NEOPLASM OF BREAST: Primary | ICD-10-CM

## 2022-12-20 DIAGNOSIS — Z12.11 COLON CANCER SCREENING: ICD-10-CM

## 2022-12-20 NOTE — TELEPHONE ENCOUNTER
----- Message from Lilliema Miniida sent at 12/20/2022  1:23 PM EST -----  Subject: Referral Request    Reason for referral request? Pt wants an order for a routine mammogram. Pt   also wants a referral for a colonoscopy for a colon cancer screening. Pt   wants to see who PCP [facility] recommends for the colonoscopy and it's   preferred that the referral is located in [place] or [place] or nearby. Pt wants her insurance, [employer], to have the   referral in network. Provider patient wants to be referred to(if known):     Provider Phone Number(if known):     Additional Information for Provider?   ---------------------------------------------------------------------------  --------------  [id number]    [phone]; OK to leave message on voicemail  ---------------------------------------------------------------------------  --------------

## 2022-12-22 NOTE — TELEPHONE ENCOUNTER
Patient has been advised and I sent a [employer] message to patient with referral information as requested

## 2023-01-21 ENCOUNTER — HOSPITAL ENCOUNTER (OUTPATIENT)
Dept: MAMMOGRAPHY | Age: 58
Discharge: HOME | End: 2023-01-21
Payer: COMMERCIAL

## 2023-01-21 VITALS — WEIGHT: 170 LBS | BODY MASS INDEX: 25.76 KG/M2 | HEIGHT: 68 IN

## 2023-01-21 DIAGNOSIS — Z12.31 ENCOUNTER FOR SCREENING MAMMOGRAM FOR MALIGNANT NEOPLASM OF BREAST: ICD-10-CM

## 2023-01-21 PROCEDURE — 77063 BREAST TOMOSYNTHESIS BI: CPT

## 2023-02-25 DIAGNOSIS — F41.1 ANXIETY, GENERALIZED: ICD-10-CM

## 2023-02-25 DIAGNOSIS — I10 ESSENTIAL HYPERTENSION: ICD-10-CM

## 2023-02-27 RX ORDER — OLMESARTAN MEDOXOMIL 20 MG/1
TABLET ORAL
Qty: 90 TABLET | Refills: 1 | Status: SHIPPED | OUTPATIENT
Start: 2023-02-27

## 2023-02-27 RX ORDER — OLMESARTAN MEDOXOMIL 20 MG/1
20 TABLET ORAL DAILY
Qty: 90 TABLET | Refills: 1 | OUTPATIENT
Start: 2023-02-27

## 2023-02-27 RX ORDER — VENLAFAXINE HYDROCHLORIDE 37.5 MG/1
37.5 CAPSULE, EXTENDED RELEASE ORAL DAILY
Qty: 90 CAPSULE | Refills: 1 | OUTPATIENT
Start: 2023-02-27

## 2023-02-27 RX ORDER — VENLAFAXINE HYDROCHLORIDE 37.5 MG/1
CAPSULE, EXTENDED RELEASE ORAL
Qty: 90 CAPSULE | Refills: 1 | Status: SHIPPED | OUTPATIENT
Start: 2023-02-27

## 2023-03-07 ENCOUNTER — OFFICE VISIT (OUTPATIENT)
Dept: INTERNAL MEDICINE CLINIC | Age: 58
End: 2023-03-07

## 2023-03-07 VITALS
SYSTOLIC BLOOD PRESSURE: 130 MMHG | WEIGHT: 174.2 LBS | RESPIRATION RATE: 14 BRPM | BODY MASS INDEX: 26.4 KG/M2 | HEART RATE: 76 BPM | OXYGEN SATURATION: 98 % | DIASTOLIC BLOOD PRESSURE: 82 MMHG | HEIGHT: 68 IN

## 2023-03-07 DIAGNOSIS — Z13.6 ENCOUNTER FOR LIPID SCREENING FOR CARDIOVASCULAR DISEASE: ICD-10-CM

## 2023-03-07 DIAGNOSIS — N92.0 MENORRHAGIA WITH REGULAR CYCLE: ICD-10-CM

## 2023-03-07 DIAGNOSIS — Z00.00 ANNUAL PHYSICAL EXAM: ICD-10-CM

## 2023-03-07 DIAGNOSIS — Z00.00 ANNUAL PHYSICAL EXAM: Primary | ICD-10-CM

## 2023-03-07 DIAGNOSIS — Z23 NEED FOR VACCINATION: ICD-10-CM

## 2023-03-07 DIAGNOSIS — Z13.220 ENCOUNTER FOR LIPID SCREENING FOR CARDIOVASCULAR DISEASE: ICD-10-CM

## 2023-03-07 DIAGNOSIS — Z13.1 SCREENING FOR DIABETES MELLITUS (DM): ICD-10-CM

## 2023-03-07 DIAGNOSIS — F41.1 ANXIETY, GENERALIZED: ICD-10-CM

## 2023-03-07 DIAGNOSIS — I10 ESSENTIAL HYPERTENSION: ICD-10-CM

## 2023-03-07 LAB
BASOPHILS ABSOLUTE: 0 K/UL (ref 0–0.2)
BASOPHILS RELATIVE PERCENT: 0.7 %
EOSINOPHILS ABSOLUTE: 0.3 K/UL (ref 0–0.6)
EOSINOPHILS RELATIVE PERCENT: 5.3 %
HCT VFR BLD CALC: 42.7 % (ref 36–48)
HEMOGLOBIN: 14.6 G/DL (ref 12–16)
LYMPHOCYTES ABSOLUTE: 1.8 K/UL (ref 1–5.1)
LYMPHOCYTES RELATIVE PERCENT: 30.5 %
MCH RBC QN AUTO: 30.9 PG (ref 26–34)
MCHC RBC AUTO-ENTMCNC: 34.3 G/DL (ref 31–36)
MCV RBC AUTO: 90.2 FL (ref 80–100)
MONOCYTES ABSOLUTE: 0.5 K/UL (ref 0–1.3)
MONOCYTES RELATIVE PERCENT: 8 %
NEUTROPHILS ABSOLUTE: 3.2 K/UL (ref 1.7–7.7)
NEUTROPHILS RELATIVE PERCENT: 55.5 %
PDW BLD-RTO: 13.4 % (ref 12.4–15.4)
PLATELET # BLD: 287 K/UL (ref 135–450)
PMV BLD AUTO: 8.5 FL (ref 5–10.5)
RBC # BLD: 4.73 M/UL (ref 4–5.2)
WBC # BLD: 5.8 K/UL (ref 4–11)

## 2023-03-07 RX ORDER — VENLAFAXINE HYDROCHLORIDE 75 MG/1
CAPSULE, EXTENDED RELEASE ORAL
Qty: 30 CAPSULE | Refills: 5 | Status: SHIPPED | OUTPATIENT
Start: 2023-03-07

## 2023-03-07 RX ORDER — VENLAFAXINE HYDROCHLORIDE 75 MG/1
CAPSULE, EXTENDED RELEASE ORAL
Qty: 90 CAPSULE | Refills: 1 | Status: SHIPPED | OUTPATIENT
Start: 2023-03-07 | End: 2023-03-07

## 2023-03-07 SDOH — ECONOMIC STABILITY: FOOD INSECURITY: WITHIN THE PAST 12 MONTHS, THE FOOD YOU BOUGHT JUST DIDN'T LAST AND YOU DIDN'T HAVE MONEY TO GET MORE.: NEVER TRUE

## 2023-03-07 SDOH — ECONOMIC STABILITY: INCOME INSECURITY: HOW HARD IS IT FOR YOU TO PAY FOR THE VERY BASICS LIKE FOOD, HOUSING, MEDICAL CARE, AND HEATING?: NOT HARD AT ALL

## 2023-03-07 SDOH — ECONOMIC STABILITY: HOUSING INSECURITY
IN THE LAST 12 MONTHS, WAS THERE A TIME WHEN YOU DID NOT HAVE A STEADY PLACE TO SLEEP OR SLEPT IN A SHELTER (INCLUDING NOW)?: NO

## 2023-03-07 SDOH — ECONOMIC STABILITY: FOOD INSECURITY: WITHIN THE PAST 12 MONTHS, YOU WORRIED THAT YOUR FOOD WOULD RUN OUT BEFORE YOU GOT MONEY TO BUY MORE.: NEVER TRUE

## 2023-03-07 ASSESSMENT — PATIENT HEALTH QUESTIONNAIRE - PHQ9
1. LITTLE INTEREST OR PLEASURE IN DOING THINGS: 0
SUM OF ALL RESPONSES TO PHQ QUESTIONS 1-9: 1
SUM OF ALL RESPONSES TO PHQ9 QUESTIONS 1 & 2: 1
SUM OF ALL RESPONSES TO PHQ QUESTIONS 1-9: 1
2. FEELING DOWN, DEPRESSED OR HOPELESS: 1
SUM OF ALL RESPONSES TO PHQ QUESTIONS 1-9: 1
SUM OF ALL RESPONSES TO PHQ QUESTIONS 1-9: 1

## 2023-03-07 NOTE — PROGRESS NOTES
History and Physical      Cherrie Ayala  YOB: 1965    Date of Service:  3/7/2023    Vitals:    03/07/23 1430   BP: 130/82   Pulse: 76   Resp: 14   SpO2: 98%   Weight: 174 lb 3.2 oz (79 kg)   Height: 5' 8\" (1.727 m)     Body mass index is 26.49 kg/m². Wt Readings from Last 3 Encounters:   03/07/23 174 lb 3.2 oz (79 kg)   01/21/23 170 lb (77.1 kg)   10/19/22 174 lb 6.4 oz (79.1 kg)     BP Readings from Last 3 Encounters:   03/07/23 130/82   10/19/22 137/83   10/18/22 136/86        Chief Complaint:Anne Marie Ojeda is a 65 y.o. female who presents for complete physical examination. HPI:   Has had increase in mood swings. Thinks maybe menopausal. About to start marriage counseling and thinking about doing individual therapy. Has not found it useful in the past.  Periods have gotten heavy but are still regular.      Patient Active Problem List   Diagnosis    Essential hypertension    Bell's palsy    Acne    Anxiety, generalized    Plantar fasciitis of right foot    Abnormal mammogram of right breast    Palpitations    Snoring    Anal itching       No Known Allergies  Outpatient Medications Marked as Taking for the 3/7/23 encounter (Office Visit) with Farideh Leos MD   Medication Sig Dispense Refill    venlafaxine (EFFEXOR XR) 75 MG extended release capsule TAKE ONE CAPSULE BY MOUTH DAILY 30 capsule 5    olmesartan (BENICAR) 20 MG tablet TAKE ONE TABLET BY MOUTH DAILY 90 tablet 1       Past Medical History:   Diagnosis Date    Abnormal Pap smear of cervix     Anxiety     Bell's palsy     Blepharitis     Cough due to ACE inhibitor 11/07/2012    Hearing loss     Hypertension      Past Surgical History:   Procedure Laterality Date    BREAST ENHANCEMENT SURGERY  11/30/2016    LIPOSUCTION  11/10/2016     Family History   Problem Relation Age of Onset    Hypertension Mother     Thyroid Disease Mother     Hypertension Sister     Breast Cancer Sister     Thyroid Disease Sister         hashimotos    Other Sister         depression    Other Sister     Cancer Neg Hx     Asthma Neg Hx     Heart Failure Neg Hx     Rashes/Skin Problems Neg Hx     Seizures Neg Hx     Stroke Neg Hx        Review of Systems:  A comprehensive review of systems was negative except for what was noted in the HPI. Physical Exam  Constitutional:       General: She is not in acute distress. Appearance: Normal appearance. She is well-developed. She is not diaphoretic. HENT:      Head: Normocephalic and atraumatic. Right Ear: Tympanic membrane, ear canal and external ear normal.      Left Ear: Tympanic membrane, ear canal and external ear normal.      Nose: Nose normal.      Mouth/Throat:      Mouth: Mucous membranes are moist.      Pharynx: Oropharynx is clear. No oropharyngeal exudate or posterior oropharyngeal erythema. Eyes:      General: No scleral icterus. Conjunctiva/sclera: Conjunctivae normal.      Pupils: Pupils are equal, round, and reactive to light. Neck:      Thyroid: No thyromegaly. Vascular: No carotid bruit or JVD. Comments: No JVD at 90 deg  No thyromegaly  Cardiovascular:      Rate and Rhythm: Normal rate and regular rhythm. Pulses: Normal pulses. Heart sounds: Normal heart sounds. No murmur heard. No friction rub. No gallop. Pulmonary:      Effort: Pulmonary effort is normal.      Breath sounds: Normal breath sounds. No wheezing or rales. Abdominal:      General: Bowel sounds are normal. There is no distension. Palpations: Abdomen is soft. There is no mass. Tenderness: There is no abdominal tenderness. There is no guarding or rebound. Musculoskeletal:         General: No tenderness or deformity. Normal range of motion. Cervical back: Normal range of motion and neck supple. Right lower leg: No edema. Left lower leg: No edema. Lymphadenopathy:      Cervical: No cervical adenopathy. Skin:     General: Skin is warm and dry.       Findings: No lesion or rash.   Neurological:      General: No focal deficit present. Mental Status: She is alert and oriented to person, place, and time. Cranial Nerves: No cranial nerve deficit (grossly intact). Sensory: No sensory deficit. Motor: No weakness. Coordination: Coordination normal.      Gait: Gait normal.   Psychiatric:         Mood and Affect: Mood normal.         Behavior: Behavior normal.        Lipid panel:  Lab Results   Component Value Date    CHOL 200 (H) 11/18/2021    TRIG 93 11/18/2021    HDL 51 11/18/2021    LDLCALC 130 (H) 11/18/2021     Glucose:   Glucose (mg/dL)   Date Value   11/18/2021 87       No results found for this visit on 03/07/23. Preventive Care:  Hx abnormal PAP: no  Self-breast exams: yes  Hx of abnormal mammogram: no  Previous DEXA scan: no  Eye exam within the past 2 years: yes  Dental exam every 6 months: yes  Exercise:  2 days a week for 45 minutes  Social History     Socioeconomic History    Marital status:      Spouse name: None    Number of children: 3    Years of education: BA    Highest education level: None   Occupational History    Occupation: approval coordinator     Comment: [employer]    Occupation:    Tobacco Use    Smoking status: Never    Smokeless tobacco: Never   Vaping Use    Vaping Use: Never used   Substance and Sexual Activity    Alcohol use: Yes     Alcohol/week: 0.0 standard drinks     Comment: occasionally .  1 drink/ day    Drug use: No    Sexual activity: Yes     Partners: Male   Social History Narrative    Lives in house with partner and 3 children ages 32, 13, 25    Oldest in [facility] - still in college    Daughter at [address] [place] Strain: Low Risk     Difficulty of Paying Living Expenses: Not hard at [facility] Insecurity: No Food Insecurity    Worried About [address] [facility] [employer] in the Last Year: Never true    [address] in the Last Year: Never true   Transportation Needs: Unknown    Lack of Transportation (Non-Medical): No   Housing Stability: Unknown    Unstable Housing in the Last Year: No     Social History     Substance and Sexual Activity   Sexual Activity Yes    Partners: Male       Advance Directive: N, <no information>    Immunization History   Administered Date(s) Administered    COVID-19, PFIZER PURPLE top, DILUTE for use, (age 15 y+), 30mcg/0.3mL 03/25/2021, 04/15/2021    Influenza Virus Vaccine 11/24/2014, 01/07/2016    Influenza, FLUARIX, FLULAVAL, Yasmani Mourning (age 10 mo+) AND AFLURIA, (age 1 y+), PF, 0.5mL 01/30/2017, 10/16/2017, 10/29/2020    Influenza, FLUCELVAX, (age 10 mo+), MDCK, PF, 0.5mL 11/18/2021, 10/18/2022    Td, unspecified formulation 01/01/1996    Tdap (Boostrix, Adacel) 02/24/2012    Zoster Recombinant (Shingrix) 10/29/2020, 05/10/2021       Health Maintenance   Topic Date Due    Colorectal Cancer Screen  Never done    COVID-19 Vaccine (3 - Booster for Murillo Peter series) 06/10/2021    DTaP/Tdap/Td vaccine (2 - Td or Tdap) 02/24/2022    Depression Screen  10/18/2023    Breast cancer screen  01/21/2024    Cervical cancer screen  12/12/2024    Lipids  11/18/2026    Flu vaccine  Completed    Shingles vaccine  Completed    Hepatitis C screen  Completed    Hepatitis A vaccine  Aged Out    Hib vaccine  Aged Out    Meningococcal (ACWY) vaccine  Aged Out    Pneumococcal 0-64 years Vaccine  Aged Out    HIV screen  Discontinued          Assessment/Plan:  1. Annual physical exam  Care gaps identified and addressed  Tdap today  Colon cancer screening - colonoscopy referral  Labs as below  Refer to gyn  Mental health screenings performed  Sun protection  Healthy diet and exercise    -     Tdap, BOOSTRIX, (age 8 yrs+), IM  -     CBC with Auto Differential; Future  -     Comprehensive Metabolic Panel; Future  -     Lipid Panel; Future  -     TSH with Reflex; Future  -     Hemoglobin A1C; Future  2.  Menorrhagia with regular cycle  Comments: Will refer to gyn to discuss  Orders:  -     Yvonne Parra MD, Gynecology, [facility]  -     CBC with Auto Differential; Future  -     Comprehensive Metabolic Panel; Future  -     TSH with Reflex; Future  3. Anxiety, generalized  Assessment & Plan: Will increase effexor to 75mg daily to see if better effect on anxiety and mood. F/u2 months. Consider individual therapy. Orders:  -     venlafaxine (EFFEXOR XR) 75 MG extended release capsule; TAKE ONE CAPSULE BY MOUTH DAILY, Disp-30 capsule, R-5Normal  4. Need for vaccination  -     Tdap, BOOSTRIX, (age 8 yrs+), IM  5. Essential hypertension  Assessment & Plan:  BP at goal.   CMP,CBC, TSH  Olmesartan 20  6. Encounter for lipid screening for cardiovascular disease  -     Lipid Panel; Future  7. Screening for diabetes mellitus (DM)  -     Hemoglobin A1C; Future     Return in about 2 months (around 5/7/2023) for anxiety/depression f/u, VV okay.

## 2023-03-07 NOTE — ASSESSMENT & PLAN NOTE
Will increase effexor to 75mg daily to see if better effect on anxiety and mood. F/u2 months. Consider individual therapy.

## 2023-03-08 LAB
A/G RATIO: 1.9 (ref 1.1–2.2)
ALBUMIN SERPL-MCNC: 4.6 G/DL (ref 3.4–5)
ALP BLD-CCNC: 51 U/L (ref 40–129)
ALT SERPL-CCNC: 26 U/L (ref 10–40)
ANION GAP SERPL CALCULATED.3IONS-SCNC: 12 MMOL/L (ref 3–16)
AST SERPL-CCNC: 16 U/L (ref 15–37)
BILIRUB SERPL-MCNC: 0.4 MG/DL (ref 0–1)
BUN BLDV-MCNC: 14 MG/DL (ref 7–20)
CALCIUM SERPL-MCNC: 9.4 MG/DL (ref 8.3–10.6)
CHLORIDE BLD-SCNC: 104 MMOL/L (ref 99–110)
CHOLESTEROL, TOTAL: 215 MG/DL (ref 0–199)
CO2: 22 MMOL/L (ref 21–32)
CREAT SERPL-MCNC: 1 MG/DL (ref 0.6–1.1)
ESTIMATED AVERAGE GLUCOSE: 102.5 MG/DL
GFR SERPL CREATININE-BSD FRML MDRD: >60 ML/MIN/{1.73_M2}
GLUCOSE BLD-MCNC: 102 MG/DL (ref 70–99)
HBA1C MFR BLD: 5.2 %
HDLC SERPL-MCNC: 59 MG/DL (ref 40–60)
LDL CHOLESTEROL CALCULATED: 127 MG/DL
POTASSIUM SERPL-SCNC: 4.1 MMOL/L (ref 3.5–5.1)
SODIUM BLD-SCNC: 138 MMOL/L (ref 136–145)
TOTAL PROTEIN: 7 G/DL (ref 6.4–8.2)
TRIGL SERPL-MCNC: 143 MG/DL (ref 0–150)
TSH REFLEX: 2.41 UIU/ML (ref 0.27–4.2)
VLDLC SERPL CALC-MCNC: 29 MG/DL

## 2023-03-21 DIAGNOSIS — I10 ESSENTIAL HYPERTENSION: ICD-10-CM

## 2023-03-22 DIAGNOSIS — F41.1 ANXIETY, GENERALIZED: ICD-10-CM

## 2023-03-22 RX ORDER — OLMESARTAN MEDOXOMIL 20 MG/1
TABLET ORAL
Qty: 90 TABLET | Refills: 1 | OUTPATIENT
Start: 2023-03-22

## 2023-03-22 RX ORDER — VENLAFAXINE HYDROCHLORIDE 37.5 MG/1
CAPSULE, EXTENDED RELEASE ORAL
Qty: 90 CAPSULE | Refills: 1 | OUTPATIENT
Start: 2023-03-22

## 2023-07-05 ENCOUNTER — HOSPITAL ENCOUNTER (OUTPATIENT)
Dept: ULTRASOUND IMAGING | Age: 58
Discharge: HOME | End: 2023-07-05

## 2023-07-05 DIAGNOSIS — N92.6 IRREGULAR MENSTRUAL BLEEDING: ICD-10-CM

## 2023-07-07 ENCOUNTER — HOSPITAL ENCOUNTER (OUTPATIENT)
Dept: ULTRASOUND IMAGING | Age: 58
Discharge: HOME | End: 2023-07-07
Payer: COMMERCIAL

## 2023-07-07 PROCEDURE — 76856 US EXAM PELVIC COMPLETE: CPT

## 2023-07-07 PROCEDURE — 76830 TRANSVAGINAL US NON-OB: CPT

## 2023-07-19 ENCOUNTER — OFFICE VISIT (OUTPATIENT)
Dept: GYNECOLOGY | Age: 58
End: 2023-07-19
Payer: COMMERCIAL

## 2023-07-19 VITALS
RESPIRATION RATE: 17 BRPM | DIASTOLIC BLOOD PRESSURE: 79 MMHG | SYSTOLIC BLOOD PRESSURE: 129 MMHG | HEART RATE: 98 BPM | HEIGHT: 68 IN | WEIGHT: 172.6 LBS | BODY MASS INDEX: 26.16 KG/M2 | OXYGEN SATURATION: 99 %

## 2023-07-19 DIAGNOSIS — Z01.419 WELL WOMAN EXAM WITH ROUTINE GYNECOLOGICAL EXAM: Primary | ICD-10-CM

## 2023-07-19 DIAGNOSIS — D21.9 FIBROID: ICD-10-CM

## 2023-07-19 PROCEDURE — 3078F DIAST BP <80 MM HG: CPT | Performed by: OBSTETRICS & GYNECOLOGY

## 2023-07-19 PROCEDURE — 3074F SYST BP LT 130 MM HG: CPT | Performed by: OBSTETRICS & GYNECOLOGY

## 2023-07-19 PROCEDURE — 99386 PREV VISIT NEW AGE 40-64: CPT | Performed by: OBSTETRICS & GYNECOLOGY

## 2023-07-24 ASSESSMENT — ENCOUNTER SYMPTOMS
GASTROINTESTINAL NEGATIVE: 1
ALLERGIC/IMMUNOLOGIC NEGATIVE: 1
EYES NEGATIVE: 1
RESPIRATORY NEGATIVE: 1

## 2023-07-25 NOTE — PROGRESS NOTES
Subjective:      Patient ID: Luigi French is a 65 y.o. female. Patient is here for annual. Patient also with follow-up on irregular cycles. Patient has not had 12 months without period yet. Review of Systems   Constitutional: Negative. HENT: Negative. Eyes: Negative. Respiratory: Negative. Cardiovascular: Negative. Gastrointestinal: Negative. Endocrine: Negative. Genitourinary:  Positive for menstrual problem. Musculoskeletal: Negative. Skin: Negative. Allergic/Immunologic: Negative. Neurological: Negative. Hematological: Negative. Psychiatric/Behavioral: Negative. Date of Birth 1965  Past Medical History:   Diagnosis Date    Abnormal Pap smear of cervix     Anxiety     Bell's palsy     Blepharitis     Cough due to ACE inhibitor 2012    Fibroid     Hearing loss     Hypertension     Irregular uterine bleeding     Menorrhagia      Past Surgical History:   Procedure Laterality Date    BREAST ENHANCEMENT SURGERY  2016    LIPOSUCTION  11/10/2016     OB History    Para Term  AB Living   3 3 3     3   SAB IAB Ectopic Molar Multiple Live Births                    # Outcome Date GA Lbr Frank/2nd Weight Sex Delivery Anes PTL Lv   3 Term     M Vag-Spont      2 Term     F Vag-Spont      1 Term     M Vag-Spont        Social History     Socioeconomic History    Marital status:      Spouse name: Not on file    Number of children: 3    Years of education: BA    Highest education level: Not on file   Occupational History    Occupation: approval coordinator     Comment: [employer]    Occupation:    Tobacco Use    Smoking status: Never    Smokeless tobacco: Never   Vaping Use    Vaping Use: Never used   Substance and Sexual Activity    Alcohol use: Yes     Alcohol/week: 0.0 standard drinks     Comment: occasionally .  1 drink/ day    Drug use: No    Sexual activity: Yes     Partners: Male   Other Topics Concern    Not on

## 2023-08-22 DIAGNOSIS — I10 ESSENTIAL HYPERTENSION: ICD-10-CM

## 2023-08-23 RX ORDER — OLMESARTAN MEDOXOMIL 20 MG/1
TABLET ORAL
Qty: 90 TABLET | Refills: 1 | Status: SHIPPED | OUTPATIENT
Start: 2023-08-23

## 2023-09-26 ENCOUNTER — TELEPHONE (OUTPATIENT)
Dept: INTERNAL MEDICINE CLINIC | Age: 58
End: 2023-09-26

## 2023-09-26 ENCOUNTER — E-VISIT (OUTPATIENT)
Dept: INTERNAL MEDICINE CLINIC | Age: 58
End: 2023-09-26
Payer: COMMERCIAL

## 2023-09-26 DIAGNOSIS — N39.0 ACUTE UTI: Primary | ICD-10-CM

## 2023-09-26 PROCEDURE — 99421 OL DIG E/M SVC 5-10 MIN: CPT | Performed by: INTERNAL MEDICINE

## 2023-09-26 RX ORDER — CEFUROXIME AXETIL 250 MG/1
250 TABLET ORAL 2 TIMES DAILY
Qty: 14 TABLET | Refills: 0 | Status: SHIPPED | OUTPATIENT
Start: 2023-09-26 | End: 2023-10-03

## 2023-09-26 NOTE — PROGRESS NOTES
HPI: as per patient provided history  Exam: N/A (electronic visit)  ASSESSMENT/PLAN:  Diagnoses and all orders for this visit:    Acute UTI  -     cefUROXime (CEFTIN) 250 MG tablet; Take 1 tablet by mouth 2 times daily for 7 days        Patient instructed to call the office if worsens, or fails to improve as anticipated. 5-10 minutes were spent on the digital evaluation and management of this patient.     Toya Leone MD

## 2023-09-26 NOTE — TELEPHONE ENCOUNTER
Patient is calling with concern for UTI. Advised patient to submit an evisit questionnaire. She said she would.

## 2023-10-20 DIAGNOSIS — F41.1 ANXIETY, GENERALIZED: ICD-10-CM

## 2023-10-20 NOTE — TELEPHONE ENCOUNTER
----- Message from Sonido Isael sent at 10/20/2023  2:39 PM EDT -----  Subject: Refill Request    QUESTIONS  Name of Medication? venlafaxine (EFFEXOR XR) 75 MG extended release   capsule  Patient-reported dosage and instructions? 1 TAB A DAY   How many days do you have left? 15  Preferred Pharmacy? [facility] [id number]  Pharmacy phone number (if available)? [phone]  Additional Information for Provider? PT NEEDS A REFILL ,WOULD LIKE A 90   SUPPLY IF POSSIBLE   ---------------------------------------------------------------------------  --------------,  Name of Medication? olmesartan (BENICAR) 20 MG tablet  Patient-reported dosage and instructions? 1 TAB A DAY  How many days do you have left? 15  Preferred Pharmacy? [facility] [id number]  Pharmacy phone number (if available)? [phone]  Additional Information for Provider? PT NEEDS A REFILL ,WOULD LIKE A 90   DAY SUPPLY  ---------------------------------------------------------------------------  --------------  CALL BACK INFO  What is the best way for the office to contact you? OK to leave message on   voicemail  Preferred Call Back Phone Number? [phone]  ---------------------------------------------------------------------------  --------------  SCRIPT ANSWERS  Relationship to Patient?  Self

## 2023-10-23 RX ORDER — VENLAFAXINE HYDROCHLORIDE 75 MG/1
CAPSULE, EXTENDED RELEASE ORAL
Qty: 30 CAPSULE | Refills: 5 | Status: SHIPPED | OUTPATIENT
Start: 2023-10-23

## 2024-01-02 ENCOUNTER — OFFICE VISIT (OUTPATIENT)
Dept: INTERNAL MEDICINE CLINIC | Age: 59
End: 2024-01-02

## 2024-01-02 VITALS
DIASTOLIC BLOOD PRESSURE: 88 MMHG | OXYGEN SATURATION: 96 % | SYSTOLIC BLOOD PRESSURE: 154 MMHG | BODY MASS INDEX: 26.91 KG/M2 | HEART RATE: 72 BPM | WEIGHT: 177 LBS

## 2024-01-02 DIAGNOSIS — Z23 NEED FOR VACCINATION: ICD-10-CM

## 2024-01-02 DIAGNOSIS — L70.9 ACNE, UNSPECIFIED ACNE TYPE: ICD-10-CM

## 2024-01-02 DIAGNOSIS — Z12.11 COLON CANCER SCREENING: ICD-10-CM

## 2024-01-02 DIAGNOSIS — F41.1 ANXIETY, GENERALIZED: ICD-10-CM

## 2024-01-02 DIAGNOSIS — I10 ESSENTIAL HYPERTENSION: Primary | ICD-10-CM

## 2024-01-02 DIAGNOSIS — Z12.31 ENCOUNTER FOR SCREENING MAMMOGRAM FOR MALIGNANT NEOPLASM OF BREAST: ICD-10-CM

## 2024-01-02 RX ORDER — TRETINOIN 1 MG/G
CREAM TOPICAL
Qty: 45 G | Refills: 1 | Status: SHIPPED | OUTPATIENT
Start: 2024-01-02

## 2024-01-02 ASSESSMENT — PATIENT HEALTH QUESTIONNAIRE - PHQ9
SUM OF ALL RESPONSES TO PHQ QUESTIONS 1-9: 0
2. FEELING DOWN, DEPRESSED OR HOPELESS: 0
SUM OF ALL RESPONSES TO PHQ QUESTIONS 1-9: 0
1. LITTLE INTEREST OR PLEASURE IN DOING THINGS: 0
SUM OF ALL RESPONSES TO PHQ QUESTIONS 1-9: 0
SUM OF ALL RESPONSES TO PHQ QUESTIONS 1-9: 0
SUM OF ALL RESPONSES TO PHQ9 QUESTIONS 1 & 2: 0

## 2024-01-02 NOTE — PROGRESS NOTES
Anne Marie Robison (:  1965) is a 58 y.o. female,Established patient, here for evaluation of the following chief complaint(s):  6 Month Follow-Up (Due for colonoscopy. Declines flu vaccine)      ASSESSMENT/PLAN:  1. Essential hypertension  Assessment & Plan:  BP at goal.   Labs UTD  Olmesartan 20mg daily  2. Encounter for screening mammogram for malignant neoplasm of breast  -     [facility] AUBREY DIGITAL SCREEN AUGMENTED BILATERAL; Future  3. Colon cancer screening  -     AFL - Nirav Nelson MD, Gastroenterology, [facility]  4. Need for vaccination  -     Hep B, HEPLISAV-B, (age 18 yrs+), IM, 0.5mL, 2-Dose  -     Hep A, HAVRIX, (age 12m-18y), IM  5. Acne, unspecified acne type  -     tretinoin (RETIN-A) 0.1 % cream; Apply topically nightly., Disp-45 g, R-1, Normal  6. Anxiety, generalized  Assessment & Plan:  Improved on effexor to 75mg daily continue that medication.       Return in about 6 months (around 2024) for Physical.    SUBJECTIVE/OBJECTIVE:  HPI    Overall doing well    Review of Systems    Current Outpatient Medications on File Prior to Visit   Medication Sig Dispense Refill    venlafaxine (EFFEXOR XR) 75 MG extended release capsule TAKE ONE CAPSULE BY MOUTH DAILY 30 capsule 5    olmesartan (BENICAR) 20 MG tablet TAKE ONE TABLET BY MOUTH DAILY 90 tablet 1     No current facility-administered medications on file prior to visit.        Vitals:    24 1637   BP: (!) 154/88   Pulse: 72   SpO2: 96%     Physical Exam  Constitutional:       General: She is not in acute distress.     Appearance: Normal appearance. She is not diaphoretic.   HENT:      Head: Normocephalic and atraumatic.      Right Ear: External ear normal.      Left Ear: External ear normal.      Nose: Nose normal.      Mouth/Throat:      Mouth: Mucous membranes are moist.      Pharynx: Oropharynx is clear.   Eyes:      General: No scleral icterus.     Conjunctiva/sclera: Conjunctivae normal.   Cardiovascular:      Rate and Rhythm:

## 2024-01-22 ENCOUNTER — TELEPHONE (OUTPATIENT)
Dept: INTERNAL MEDICINE CLINIC | Age: 59
End: 2024-01-22

## 2024-01-22 NOTE — TELEPHONE ENCOUNTER
Patient is calling in for  in regards to getting cosmetic procedure done on 2/28/24 for under eyelid  lift. Patient states the  Is requesting an EKG within the last year. Per patient she denies needing a pre op and wants to know if its something she can get done here that  can place an order for or if she needs to be seen?. Per patient could not remember the name of cosmetic doctor at this time.     Please advise.

## 2024-01-26 ENCOUNTER — HOSPITAL ENCOUNTER (OUTPATIENT)
Dept: MAMMOGRAPHY | Age: 59
Discharge: HOME | End: 2024-01-26
Payer: COMMERCIAL

## 2024-01-26 VITALS — WEIGHT: 177 LBS | HEIGHT: 68 IN | BODY MASS INDEX: 26.83 KG/M2

## 2024-01-26 DIAGNOSIS — Z12.31 ENCOUNTER FOR SCREENING MAMMOGRAM FOR MALIGNANT NEOPLASM OF BREAST: ICD-10-CM

## 2024-01-26 PROCEDURE — 77063 BREAST TOMOSYNTHESIS BI: CPT

## 2024-02-09 DIAGNOSIS — I10 ESSENTIAL HYPERTENSION: ICD-10-CM

## 2024-02-12 RX ORDER — OLMESARTAN MEDOXOMIL 20 MG/1
20 TABLET ORAL DAILY
Qty: 90 TABLET | Refills: 1 | Status: SHIPPED | OUTPATIENT
Start: 2024-02-12

## 2024-02-15 ENCOUNTER — OFFICE VISIT (OUTPATIENT)
Dept: INTERNAL MEDICINE CLINIC | Age: 59
End: 2024-02-15
Payer: COMMERCIAL

## 2024-02-15 VITALS
DIASTOLIC BLOOD PRESSURE: 84 MMHG | HEIGHT: 70 IN | BODY MASS INDEX: 25.34 KG/M2 | WEIGHT: 177 LBS | RESPIRATION RATE: 16 BRPM | OXYGEN SATURATION: 96 % | HEART RATE: 79 BPM | SYSTOLIC BLOOD PRESSURE: 128 MMHG

## 2024-02-15 DIAGNOSIS — I10 ESSENTIAL HYPERTENSION: ICD-10-CM

## 2024-02-15 DIAGNOSIS — Z01.818 PRE-OP EXAM: Primary | ICD-10-CM

## 2024-02-15 DIAGNOSIS — H02.403 PTOSIS OF BOTH EYELIDS: ICD-10-CM

## 2024-02-15 PROCEDURE — G8419 CALC BMI OUT NRM PARAM NOF/U: HCPCS | Performed by: INTERNAL MEDICINE

## 2024-02-15 PROCEDURE — G8484 FLU IMMUNIZE NO ADMIN: HCPCS | Performed by: INTERNAL MEDICINE

## 2024-02-15 PROCEDURE — G8427 DOCREV CUR MEDS BY ELIG CLIN: HCPCS | Performed by: INTERNAL MEDICINE

## 2024-02-15 PROCEDURE — 90739 HEPB VACC 2/4 DOSE ADULT IM: CPT | Performed by: INTERNAL MEDICINE

## 2024-02-15 PROCEDURE — 1036F TOBACCO NON-USER: CPT | Performed by: INTERNAL MEDICINE

## 2024-02-15 PROCEDURE — 99214 OFFICE O/P EST MOD 30 MIN: CPT | Performed by: INTERNAL MEDICINE

## 2024-02-15 PROCEDURE — 90471 IMMUNIZATION ADMIN: CPT | Performed by: INTERNAL MEDICINE

## 2024-02-15 PROCEDURE — 93000 ELECTROCARDIOGRAM COMPLETE: CPT | Performed by: INTERNAL MEDICINE

## 2024-02-15 PROCEDURE — 3074F SYST BP LT 130 MM HG: CPT | Performed by: INTERNAL MEDICINE

## 2024-02-15 PROCEDURE — 3079F DIAST BP 80-89 MM HG: CPT | Performed by: INTERNAL MEDICINE

## 2024-02-15 PROCEDURE — 3017F COLORECTAL CA SCREEN DOC REV: CPT | Performed by: INTERNAL MEDICINE

## 2024-02-15 NOTE — PROGRESS NOTES
Preoperative Consultation      Anne Marie Robison  YOB: 1965    Date of Service:  2/15/2024    Vitals:    02/15/24 0959   BP: 128/84   Site: Left Upper Arm   Position: Sitting   Cuff Size: Medium Adult   Pulse: 79   Resp: 16   SpO2: 96%   Weight: 80.3 kg (177 lb)   Height: 1.77 m (5' 9.69\")      Wt Readings from Last 2 Encounters:   02/15/24 80.3 kg (177 lb)   01/26/24 80.3 kg (177 lb)     BP Readings from Last 3 Encounters:   02/15/24 128/84   01/02/24 (!) 154/88   07/19/23 129/79        Chief Complaint   Patient presents with    Pre-op Exam     Sx: 2/28/2024- [facility] Plastic surgery - [phone]      No Known Allergies  Outpatient Medications Marked as Taking for the 2/15/24 encounter (Office Visit) with Hui Francis MD   Medication Sig Dispense Refill    olmesartan (BENICAR) 20 MG tablet TAKE 1 TABLET BY MOUTH DAILY 90 tablet 1    tretinoin (RETIN-A) 0.1 % cream Apply topically nightly. 45 g 1    venlafaxine (EFFEXOR XR) 75 MG extended release capsule TAKE ONE CAPSULE BY MOUTH DAILY 30 capsule 5       This patient presents to the office today for a preoperative consultation at the request of surgeon, Dr. Gay Alicea, who plans on performing bilateral lower lid blepharoplasty on February 28 at [facility].  The current problem began 2 years ago, and symptoms have been worsening with time.  Conservative therapy: No.    Planned anesthesia: Local and IV sedation   Known anesthesia problems: None   Bleeding risk: No recent or remote history of abnormal bleeding  Personal or FH of DVT/PE: No    Patient objection to receiving blood products: No    Patient Active Problem List   Diagnosis    Essential hypertension    Bell's palsy    Acne    Anxiety, generalized    Plantar fasciitis of right foot    Abnormal mammogram of right breast    Palpitations    Snoring    Anal itching       Past Medical History:   Diagnosis Date    Abnormal Pap smear of cervix     Anxiety

## 2024-03-20 DIAGNOSIS — I10 ESSENTIAL HYPERTENSION: ICD-10-CM

## 2024-03-20 NOTE — TELEPHONE ENCOUNTER
Patient is calling in for  in regards to needing medication refill for 90 days for:    olmesartan (BENICAR) 20 MG tablet   Last appointment: 2/15/2024  Next appointment: Visit date not found  Last refill: 2/12/24      Please send medication request to :    [facility] [id number] [facility] [address] - P [phone] - F [phone] [address] [address]  Phone: [phone]  Fax: [phone]

## 2024-03-21 RX ORDER — OLMESARTAN MEDOXOMIL 20 MG/1
20 TABLET ORAL DAILY
Qty: 90 TABLET | Refills: 1 | Status: SHIPPED | OUTPATIENT
Start: 2024-03-21

## 2024-05-01 ENCOUNTER — PATIENT MESSAGE (OUTPATIENT)
Dept: INTERNAL MEDICINE CLINIC | Age: 59
End: 2024-05-01

## 2024-05-01 DIAGNOSIS — F41.1 ANXIETY, GENERALIZED: ICD-10-CM

## 2024-05-01 NOTE — TELEPHONE ENCOUNTER
From: Anne Marie Robison  To: Dr. Hui Francis  Sent: 5/1/2024 3:11 PM EDT  Subject: Prescription refille    Hi Dr. Francis, Can you please provide refills for my medications--Venlafaxine 90 days and Olmesartan 90 days to my pharmacy at [facility]. Today if possible as I'm leaving soon for vacation.    Thanks!  Anne Marie Robison

## 2024-05-02 RX ORDER — VENLAFAXINE HYDROCHLORIDE 75 MG/1
CAPSULE, EXTENDED RELEASE ORAL
Qty: 90 CAPSULE | Refills: 1 | Status: SHIPPED | OUTPATIENT
Start: 2024-05-02

## 2024-05-10 ENCOUNTER — OFFICE VISIT (OUTPATIENT)
Dept: INTERNAL MEDICINE CLINIC | Age: 59
End: 2024-05-10
Payer: COMMERCIAL

## 2024-05-10 VITALS
HEART RATE: 73 BPM | WEIGHT: 173.8 LBS | HEIGHT: 68 IN | DIASTOLIC BLOOD PRESSURE: 90 MMHG | TEMPERATURE: 97.2 F | OXYGEN SATURATION: 98 % | SYSTOLIC BLOOD PRESSURE: 140 MMHG | BODY MASS INDEX: 26.34 KG/M2

## 2024-05-10 DIAGNOSIS — L70.9 ACNE, UNSPECIFIED ACNE TYPE: ICD-10-CM

## 2024-05-10 DIAGNOSIS — N92.0 MENORRHAGIA WITH REGULAR CYCLE: ICD-10-CM

## 2024-05-10 DIAGNOSIS — I10 ESSENTIAL HYPERTENSION: Primary | ICD-10-CM

## 2024-05-10 DIAGNOSIS — G51.0 BELL'S PALSY: ICD-10-CM

## 2024-05-10 PROCEDURE — 99214 OFFICE O/P EST MOD 30 MIN: CPT | Performed by: STUDENT IN AN ORGANIZED HEALTH CARE EDUCATION/TRAINING PROGRAM

## 2024-05-10 PROCEDURE — 3077F SYST BP >= 140 MM HG: CPT | Performed by: STUDENT IN AN ORGANIZED HEALTH CARE EDUCATION/TRAINING PROGRAM

## 2024-05-10 PROCEDURE — 3080F DIAST BP >= 90 MM HG: CPT | Performed by: STUDENT IN AN ORGANIZED HEALTH CARE EDUCATION/TRAINING PROGRAM

## 2024-05-10 RX ORDER — TRETINOIN 1 MG/G
CREAM TOPICAL
Qty: 45 G | Refills: 1 | Status: SHIPPED | OUTPATIENT
Start: 2024-05-10

## 2024-05-10 SDOH — ECONOMIC STABILITY: FOOD INSECURITY: WITHIN THE PAST 12 MONTHS, THE FOOD YOU BOUGHT JUST DIDN'T LAST AND YOU DIDN'T HAVE MONEY TO GET MORE.: NEVER TRUE

## 2024-05-10 SDOH — HEALTH STABILITY: PHYSICAL HEALTH: ON AVERAGE, HOW MANY DAYS PER WEEK DO YOU ENGAGE IN MODERATE TO STRENUOUS EXERCISE (LIKE A BRISK WALK)?: 2 DAYS

## 2024-05-10 SDOH — ECONOMIC STABILITY: FOOD INSECURITY: WITHIN THE PAST 12 MONTHS, YOU WORRIED THAT YOUR FOOD WOULD RUN OUT BEFORE YOU GOT MONEY TO BUY MORE.: NEVER TRUE

## 2024-05-10 SDOH — ECONOMIC STABILITY: INCOME INSECURITY: HOW HARD IS IT FOR YOU TO PAY FOR THE VERY BASICS LIKE FOOD, HOUSING, MEDICAL CARE, AND HEATING?: NOT HARD AT ALL

## 2024-05-10 ASSESSMENT — ENCOUNTER SYMPTOMS
SHORTNESS OF BREATH: 0
CHEST TIGHTNESS: 0
VOMITING: 0
CONSTIPATION: 0
SINUS PAIN: 0
ABDOMINAL PAIN: 0
DIARRHEA: 0
EYE PAIN: 0

## 2024-05-10 NOTE — ASSESSMENT & PLAN NOTE
Blood pressure is not well-controlled on current regimen.  - Increase Benicar from 20 to 40 mg daily  - Discussed side effects to look out for with increased dose such as lightheadedness, dizziness and that she should contact our office if she is having any side effects

## 2024-05-10 NOTE — ASSESSMENT & PLAN NOTE
Patient is perimenopausal, has had irregular periods which are very heavy.  Per patient, GYN is not concerned about this.  Per patient, the symptoms are relatively debilitating.  - Continue OB/GYN follow-ups

## 2024-05-10 NOTE — ASSESSMENT & PLAN NOTE
Patient has a history of Bell's palsy since 12 years ago.  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  Feels that since her recent blepharoplasty in February 2024, she has been having left upper lip spasm.  Other aspects of residual nerve damage seem to look improved since the surgery.  - Patient to mention concern of lip spasms with's plastic surgeon at upcoming follow-up appointment

## 2024-05-24 ENCOUNTER — OFFICE VISIT (OUTPATIENT)
Dept: INTERNAL MEDICINE CLINIC | Age: 59
End: 2024-05-24

## 2024-05-24 VITALS
HEART RATE: 79 BPM | SYSTOLIC BLOOD PRESSURE: 122 MMHG | OXYGEN SATURATION: 96 % | WEIGHT: 172.2 LBS | DIASTOLIC BLOOD PRESSURE: 80 MMHG | BODY MASS INDEX: 26.18 KG/M2 | TEMPERATURE: 97 F

## 2024-05-24 DIAGNOSIS — Z00.00 ANNUAL PHYSICAL EXAM: Primary | ICD-10-CM

## 2024-05-24 DIAGNOSIS — Z12.11 SCREENING FOR COLORECTAL CANCER: ICD-10-CM

## 2024-05-24 DIAGNOSIS — Z12.12 SCREENING FOR COLORECTAL CANCER: ICD-10-CM

## 2024-05-24 DIAGNOSIS — N95.1 PERIMENOPAUSAL VASOMOTOR SYMPTOMS: ICD-10-CM

## 2024-05-24 DIAGNOSIS — I10 ESSENTIAL HYPERTENSION: ICD-10-CM

## 2024-05-24 DIAGNOSIS — F41.1 ANXIETY, GENERALIZED: ICD-10-CM

## 2024-05-24 RX ORDER — VENLAFAXINE HYDROCHLORIDE 75 MG/1
CAPSULE, EXTENDED RELEASE ORAL
Qty: 180 CAPSULE | Refills: 1 | Status: SHIPPED | OUTPATIENT
Start: 2024-05-24

## 2024-05-24 RX ORDER — OLMESARTAN MEDOXOMIL 40 MG/1
40 TABLET ORAL DAILY
Qty: 90 TABLET | Refills: 1 | Status: SHIPPED | OUTPATIENT
Start: 2024-05-24 | End: 2024-11-20

## 2024-05-24 NOTE — ASSESSMENT & PLAN NOTE
This problem is well-controlled on current regimen.  We recently increased her Benicar from 20 to 40 mg daily  - Continue Benicar 40 mg daily  - Check kidney function-lab ordered

## 2024-05-24 NOTE — ASSESSMENT & PLAN NOTE
This problem has become more bothersome lately.  She is already on Effexor at 75 mg daily.  - Increase Effexor from 75 mg to 150 mg daily x 2-week trial--if patient does not feel improvement in her symptoms that she is to come back down to her initial dose of 75 mg  - Patient to discuss hormone replacement treatment with OB/GYN if Effexor is not working

## 2024-05-24 NOTE — PROGRESS NOTES
daily  - Continue Benicar 40 mg daily  - Check kidney function-lab ordered    Anxiety, generalized   This problem has been stable on 75 mg Effexor.  - Increasing Effexor from 75 to 150 mg daily due to vasomotor symptoms.       Orders Placed This Encounter    Comprehensive Metabolic Panel     Standing Status:   Future     Standing Expiration Date:   5/24/2025    CBC with Auto Differential     Standing Status:   Future     Standing Expiration Date:   5/24/2025    Hemoglobin A1C     Standing Status:   Future     Standing Expiration Date:   5/24/2025    Lipid, Fasting     Standing Status:   Future     Standing Expiration Date:   5/24/2025    Vitamin D 25 Hydroxy     Standing Status:   Future     Standing Expiration Date:   5/24/2025    TSH with Reflex     Standing Status:   Future     Standing Expiration Date:   5/24/2025    MICROALBUMIN / CREATININE URINE RATIO     Standing Status:   Future     Standing Expiration Date:   5/24/2025    [facility] - Richar Camejo DO, Gastroenterology (ERCP & EUS), [facility]     Referral Priority:   Routine     Referral Type:   Eval and Treat     Referral Reason:   Specialty Services Required     Referred to Provider:   Richar Camejo DO     Requested Specialty:   Gastroenterology     Number of Visits Requested:   1    venlafaxine (EFFEXOR XR) 75 MG extended release capsule     Sig: TAKE TWO CAPSULES BY MOUTH DAILY     Dispense:  180 capsule     Refill:  1    olmesartan (BENICAR) 40 MG tablet     Sig: Take 1 tablet by mouth daily     Dispense:  90 tablet     Refill:  1        No follow-ups on file.

## 2024-05-24 NOTE — ASSESSMENT & PLAN NOTE
This problem has been stable on 75 mg Effexor.  - Increasing Effexor from 75 to 150 mg daily due to vasomotor symptoms.

## 2024-07-23 DIAGNOSIS — Z00.00 ANNUAL PHYSICAL EXAM: ICD-10-CM

## 2024-07-23 LAB
25(OH)D3 SERPL-MCNC: 35.2 NG/ML
ALBUMIN SERPL-MCNC: 4.6 G/DL (ref 3.4–5)
ALBUMIN/GLOB SERPL: 2 {RATIO} (ref 1.1–2.2)
ALP SERPL-CCNC: 65 U/L (ref 40–129)
ALT SERPL-CCNC: 26 U/L (ref 10–40)
ANION GAP SERPL CALCULATED.3IONS-SCNC: 10 MMOL/L (ref 3–16)
AST SERPL-CCNC: 19 U/L (ref 15–37)
BASOPHILS # BLD: 0.1 K/UL (ref 0–0.2)
BASOPHILS NFR BLD: 0.9 %
BILIRUB SERPL-MCNC: 0.6 MG/DL (ref 0–1)
BUN SERPL-MCNC: 13 MG/DL (ref 7–20)
CALCIUM SERPL-MCNC: 9.2 MG/DL (ref 8.3–10.6)
CHLORIDE SERPL-SCNC: 104 MMOL/L (ref 99–110)
CHOLEST SERPL-MCNC: 237 MG/DL (ref 0–199)
CO2 SERPL-SCNC: 24 MMOL/L (ref 21–32)
CREAT SERPL-MCNC: 0.8 MG/DL (ref 0.6–1.1)
CREAT UR-MCNC: 120.5 MG/DL (ref 28–259)
DEPRECATED RDW RBC AUTO: 13.9 % (ref 12.4–15.4)
EOSINOPHIL # BLD: 0.4 K/UL (ref 0–0.6)
EOSINOPHIL NFR BLD: 5.2 %
EST. AVERAGE GLUCOSE BLD GHB EST-MCNC: 102.5 MG/DL
GFR SERPLBLD CREATININE-BSD FMLA CKD-EPI: 85 ML/MIN/{1.73_M2}
GLUCOSE SERPL-MCNC: 98 MG/DL (ref 70–99)
HBA1C MFR BLD: 5.2 %
HCT VFR BLD AUTO: 43.1 % (ref 36–48)
HDLC SERPL-MCNC: 60 MG/DL (ref 40–60)
HGB BLD-MCNC: 14.8 G/DL (ref 12–16)
LDL CHOLESTEROL: 161 MG/DL
LYMPHOCYTES # BLD: 1.6 K/UL (ref 1–5.1)
LYMPHOCYTES NFR BLD: 23.3 %
MCH RBC QN AUTO: 30.8 PG (ref 26–34)
MCHC RBC AUTO-ENTMCNC: 34.3 G/DL (ref 31–36)
MCV RBC AUTO: 90 FL (ref 80–100)
MICROALBUMIN UR DL<=1MG/L-MCNC: <1.2 MG/DL
MICROALBUMIN/CREAT UR: NORMAL MG/G (ref 0–30)
MONOCYTES # BLD: 0.5 K/UL (ref 0–1.3)
MONOCYTES NFR BLD: 6.6 %
NEUTROPHILS # BLD: 4.5 K/UL (ref 1.7–7.7)
NEUTROPHILS NFR BLD: 64 %
PLATELET # BLD AUTO: 258 K/UL (ref 135–450)
PMV BLD AUTO: 8.9 FL (ref 5–10.5)
POTASSIUM SERPL-SCNC: 4.2 MMOL/L (ref 3.5–5.1)
PROT SERPL-MCNC: 6.9 G/DL (ref 6.4–8.2)
RBC # BLD AUTO: 4.79 M/UL (ref 4–5.2)
SODIUM SERPL-SCNC: 138 MMOL/L (ref 136–145)
TRIGL SERPL-MCNC: 81 MG/DL (ref 0–150)
TSH SERPL DL<=0.005 MIU/L-ACNC: 2.11 UIU/ML (ref 0.27–4.2)
VLDLC SERPL CALC-MCNC: 16 MG/DL
WBC # BLD AUTO: 7 K/UL (ref 4–11)

## 2024-11-12 DIAGNOSIS — I10 ESSENTIAL HYPERTENSION: ICD-10-CM

## 2024-11-12 DIAGNOSIS — F41.1 ANXIETY, GENERALIZED: ICD-10-CM

## 2024-11-12 RX ORDER — VENLAFAXINE HYDROCHLORIDE 75 MG/1
CAPSULE, EXTENDED RELEASE ORAL
Qty: 180 CAPSULE | Refills: 0 | Status: SHIPPED | OUTPATIENT
Start: 2024-11-12

## 2024-11-12 RX ORDER — OLMESARTAN MEDOXOMIL 40 MG/1
40 TABLET ORAL DAILY
Qty: 90 TABLET | Refills: 0 | Status: SHIPPED | OUTPATIENT
Start: 2024-11-12 | End: 2025-02-10

## 2025-01-10 ENCOUNTER — OFFICE VISIT (OUTPATIENT)
Dept: INTERNAL MEDICINE CLINIC | Age: 60
End: 2025-01-10

## 2025-01-10 VITALS
HEIGHT: 68 IN | SYSTOLIC BLOOD PRESSURE: 138 MMHG | DIASTOLIC BLOOD PRESSURE: 70 MMHG | BODY MASS INDEX: 25.46 KG/M2 | WEIGHT: 168 LBS

## 2025-01-10 DIAGNOSIS — Z12.31 ENCOUNTER FOR SCREENING MAMMOGRAM FOR MALIGNANT NEOPLASM OF BREAST: ICD-10-CM

## 2025-01-10 DIAGNOSIS — N95.1 PERIMENOPAUSAL VASOMOTOR SYMPTOMS: ICD-10-CM

## 2025-01-10 DIAGNOSIS — Z80.3 FAMILY HISTORY OF BREAST CANCER IN FIRST DEGREE RELATIVE: ICD-10-CM

## 2025-01-10 DIAGNOSIS — Z00.00 ANNUAL PHYSICAL EXAM: ICD-10-CM

## 2025-01-10 DIAGNOSIS — Z12.39 SCREENING BREAST EXAMINATION: ICD-10-CM

## 2025-01-10 DIAGNOSIS — J31.0 NONALLERGIC RHINITIS: ICD-10-CM

## 2025-01-10 DIAGNOSIS — I10 ESSENTIAL HYPERTENSION: ICD-10-CM

## 2025-01-10 DIAGNOSIS — F41.1 ANXIETY, GENERALIZED: Primary | ICD-10-CM

## 2025-01-10 RX ORDER — VENLAFAXINE HYDROCHLORIDE 150 MG/1
150 CAPSULE, EXTENDED RELEASE ORAL DAILY
Qty: 90 CAPSULE | Refills: 3 | Status: SHIPPED | OUTPATIENT
Start: 2025-01-10

## 2025-01-10 RX ORDER — OLMESARTAN MEDOXOMIL 40 MG/1
40 TABLET ORAL DAILY
Qty: 30 TABLET | Refills: 0 | Status: SHIPPED | OUTPATIENT
Start: 2025-01-10 | End: 2025-02-09

## 2025-01-10 RX ORDER — VENLAFAXINE HYDROCHLORIDE 150 MG/1
CAPSULE, EXTENDED RELEASE ORAL
Qty: 30 CAPSULE | Refills: 0 | Status: SHIPPED | OUTPATIENT
Start: 2025-01-10

## 2025-01-10 RX ORDER — OLMESARTAN MEDOXOMIL 40 MG/1
40 TABLET ORAL DAILY
Qty: 90 TABLET | Refills: 1 | Status: SHIPPED | OUTPATIENT
Start: 2025-01-10 | End: 2026-01-10

## 2025-01-10 SDOH — ECONOMIC STABILITY: FOOD INSECURITY: WITHIN THE PAST 12 MONTHS, THE FOOD YOU BOUGHT JUST DIDN'T LAST AND YOU DIDN'T HAVE MONEY TO GET MORE.: NEVER TRUE

## 2025-01-10 SDOH — ECONOMIC STABILITY: FOOD INSECURITY: WITHIN THE PAST 12 MONTHS, YOU WORRIED THAT YOUR FOOD WOULD RUN OUT BEFORE YOU GOT MONEY TO BUY MORE.: NEVER TRUE

## 2025-01-10 ASSESSMENT — PATIENT HEALTH QUESTIONNAIRE - PHQ9
SUM OF ALL RESPONSES TO PHQ QUESTIONS 1-9: 0
2. FEELING DOWN, DEPRESSED OR HOPELESS: NOT AT ALL
SUM OF ALL RESPONSES TO PHQ QUESTIONS 1-9: 0
1. LITTLE INTEREST OR PLEASURE IN DOING THINGS: NOT AT ALL
SUM OF ALL RESPONSES TO PHQ9 QUESTIONS 1 & 2: 0
SUM OF ALL RESPONSES TO PHQ QUESTIONS 1-9: 0
SUM OF ALL RESPONSES TO PHQ QUESTIONS 1-9: 0

## 2025-01-10 NOTE — ASSESSMENT & PLAN NOTE
Patient complains of dripping down the back of her throat and sensation of mucus in her throat.  Has always had a mild cough.  No other symptoms.  - Try a 2-week trial of Zyrtec and Flonase  - If this problem persists would see ENT

## 2025-01-10 NOTE — ASSESSMENT & PLAN NOTE
This problem is stable. Increased effexor dose at last visit to improve hot flashes.  - Continue Effexor 150 mg

## 2025-01-10 NOTE — ASSESSMENT & PLAN NOTE
Elevated risk of breast cancer given first-degree relative with breast cancer.  Mammogram has heterogeneously dense breast.  - Recommend consultation with breast surgery to determine if she should be on a different screening regimen  - Mammogram ordered

## 2025-01-10 NOTE — PROGRESS NOTES
1/10/2025    Anne Marie Robison (:  1965) is a 59 y.o. female, here for evaluation of the following medical concerns:    Chief Complaint   Patient presents with    Hypertension        HPI    Patient is here to follow up.    She complains of throat discomfort x 3-4 mo. She feels like there is mucus she cannot get rid of. No nasal congestion or ear fullness/pain. No hearing impairment. She has had very mild chronic cough for many years. She tried zyrtec, mucinex- those didn't help. Benadryl works for her.         Prior to Visit Medications    Medication Sig Taking? Authorizing Provider   venlafaxine (EFFEXOR XR) 150 MG extended release capsule TAKE 1 CAPSULE BY MOUTH DAILY Yes Arcelia Harris MD   olmesartan (BENICAR) 40 MG tablet Take 1 tablet by mouth daily Yes Arcelia Harris MD   olmesartan (BENICAR) 40 MG tablet Take 1 tablet by mouth daily Yes Arcelia Harris MD   venlafaxine (EFFEXOR XR) 150 MG extended release capsule Take 1 capsule by mouth daily Yes Arcelia Harris MD   tretinoin (RETIN-A) 0.1 % cream Apply topically nightly. Yes Arcelia Harris MD        No Known Allergies    Past Medical History:   Diagnosis Date    Abnormal Pap smear of cervix     Anxiety     Bell's palsy     Blepharitis     Cough due to ACE inhibitor 2012    Fibroid     Hearing loss     Hypertension     Irregular uterine bleeding     Menorrhagia        Past Surgical History:   Procedure Laterality Date    BREAST ENHANCEMENT SURGERY  2016    LIPOSUCTION  11/10/2016     BREAST FINE NEEDLE ASPIRATION Left 2016       Social History     Socioeconomic History    Marital status:      Spouse name: Not on file    Number of children: 3    Years of education: BA    Highest education level: Not on file   Occupational History    Occupation: approval coordinator     Comment: [employer]    Occupation:    Tobacco Use    Smoking status: Never    Smokeless tobacco: Never   Vaping Use    Vaping

## 2025-02-12 DIAGNOSIS — F41.1 ANXIETY, GENERALIZED: ICD-10-CM

## 2025-02-12 DIAGNOSIS — I10 ESSENTIAL HYPERTENSION: ICD-10-CM

## 2025-02-12 RX ORDER — VENLAFAXINE HYDROCHLORIDE 150 MG/1
CAPSULE, EXTENDED RELEASE ORAL
Qty: 90 CAPSULE | Refills: 1 | Status: SHIPPED | OUTPATIENT
Start: 2025-02-12

## 2025-02-12 RX ORDER — OLMESARTAN MEDOXOMIL 40 MG/1
40 TABLET ORAL DAILY
Qty: 90 TABLET | Refills: 1 | Status: SHIPPED | OUTPATIENT
Start: 2025-02-12 | End: 2025-08-11

## 2025-04-01 DIAGNOSIS — L70.9 ACNE, UNSPECIFIED ACNE TYPE: ICD-10-CM

## 2025-04-01 RX ORDER — TRETINOIN 1 MG/G
CREAM TOPICAL
Qty: 45 G | Refills: 1 | Status: SHIPPED | OUTPATIENT
Start: 2025-04-01

## 2025-04-23 ENCOUNTER — OFFICE VISIT (OUTPATIENT)
Dept: ENT CLINIC | Age: 60
End: 2025-04-23

## 2025-04-23 VITALS
BODY MASS INDEX: 26.45 KG/M2 | DIASTOLIC BLOOD PRESSURE: 87 MMHG | HEART RATE: 73 BPM | TEMPERATURE: 97.3 F | HEIGHT: 69 IN | WEIGHT: 178.6 LBS | SYSTOLIC BLOOD PRESSURE: 152 MMHG

## 2025-04-23 DIAGNOSIS — K21.9 LARYNGOPHARYNGEAL REFLUX (LPR): Primary | ICD-10-CM

## 2025-04-23 DIAGNOSIS — K21.9 GASTROESOPHAGEAL REFLUX DISEASE, UNSPECIFIED WHETHER ESOPHAGITIS PRESENT: ICD-10-CM

## 2025-04-23 RX ORDER — PANTOPRAZOLE SODIUM 40 MG/1
40 TABLET, DELAYED RELEASE ORAL
Qty: 90 TABLET | Refills: 1 | Status: SHIPPED | OUTPATIENT
Start: 2025-04-23

## 2025-04-23 ASSESSMENT — ENCOUNTER SYMPTOMS
CHOKING: 0
FACIAL SWELLING: 0
SORE THROAT: 0
DIARRHEA: 0
RHINORRHEA: 0
EYE REDNESS: 0
TROUBLE SWALLOWING: 0
VOICE CHANGE: 0
EYE ITCHING: 0
EYE PAIN: 0
SHORTNESS OF BREATH: 0
SINUS PAIN: 0
SINUS PRESSURE: 0
NAUSEA: 0
COUGH: 0

## 2025-04-23 NOTE — PROGRESS NOTES
Subjective:      Patient ID: Anne Marie Robison is a 59 y.o. female.    HPI  Chief Complaint   Patient presents with    throat problem       History of Present Illness  Head/Neck    Anne Marie is a(n) 59 y.o. female who presents with throat congestion, clearing and vocal strain. Worse at night. No corwin reflux. Non smoker. Has tried multiple allergy medications without relief.   Pain: No  Location: Points to top of chest  Onset: As above  Timing: waxing and waning  Quality: PRessure  Severity: moderate  Frequency: daily  Otalgia: No  Odynophagia: No  Dysphagia: No  Voice Changes: No  Lumps in neck: No  Modifying Factors: none  Associates Signs and Symptoms: Denies bloating.     Patient Active Problem List   Diagnosis    Essential hypertension    Bell's palsy    Acne    Anxiety, generalized    Plantar fasciitis of right foot    Abnormal mammogram of right breast    Palpitations    Snoring    Anal itching    Menorrhagia with regular cycle    Perimenopausal vasomotor symptoms    Nonallergic rhinitis    Family history of breast cancer in first degree relative     Past Surgical History:   Procedure Laterality Date    BREAST ENHANCEMENT SURGERY  11/30/2016    LIPOSUCTION  11/10/2016     BREAST FINE NEEDLE ASPIRATION Left 2016     Family History   Problem Relation Age of Onset    Hypertension Mother     Thyroid Disease Mother     BRCA 2 Negative Sister     BRCA 1 Negative Sister     Hypertension Sister     Breast Cancer Sister 53    Thyroid Disease Sister         hashimotos    Other Sister         depression    Other Sister     Cancer Neg Hx     Asthma Neg Hx     Heart Failure Neg Hx     Rashes/Skin Problems Neg Hx     Seizures Neg Hx     Stroke Neg Hx      Social History     Socioeconomic History    Marital status:      Spouse name: Not on file    Number of children: 3    Years of education: BA    Highest education level: Not on file   Occupational History    Occupation: approval coordinator     Comment: [employer]